# Patient Record
Sex: FEMALE | Race: WHITE | NOT HISPANIC OR LATINO | URBAN - METROPOLITAN AREA
[De-identification: names, ages, dates, MRNs, and addresses within clinical notes are randomized per-mention and may not be internally consistent; named-entity substitution may affect disease eponyms.]

---

## 2020-10-14 ENCOUNTER — EMERGENCY (EMERGENCY)
Facility: HOSPITAL | Age: 65
LOS: 0 days | Discharge: HOME | End: 2020-10-14
Attending: STUDENT IN AN ORGANIZED HEALTH CARE EDUCATION/TRAINING PROGRAM | Admitting: STUDENT IN AN ORGANIZED HEALTH CARE EDUCATION/TRAINING PROGRAM
Payer: MEDICARE

## 2020-10-14 VITALS
DIASTOLIC BLOOD PRESSURE: 79 MMHG | SYSTOLIC BLOOD PRESSURE: 169 MMHG | WEIGHT: 175.05 LBS | HEART RATE: 78 BPM | OXYGEN SATURATION: 97 % | RESPIRATION RATE: 18 BRPM | TEMPERATURE: 99 F
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
## 2020-10-14 DIAGNOSIS — S01.01XA LACERATION WITHOUT FOREIGN BODY OF SCALP, INITIAL ENCOUNTER: ICD-10-CM
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
## 2020-10-14 DIAGNOSIS — M25.512 PAIN IN LEFT SHOULDER: ICD-10-CM
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
## 2020-10-14 DIAGNOSIS — Z23 ENCOUNTER FOR IMMUNIZATION: ICD-10-CM

## 2020-10-14 PROCEDURE — 12001 RPR S/N/AX/GEN/TRNK 2.5CM/<: CPT

## 2020-10-14 PROCEDURE — 99283 EMERGENCY DEPT VISIT LOW MDM: CPT | Mod: 25

## 2020-10-14 RX ORDER — TETANUS TOXOID, REDUCED DIPHTHERIA TOXOID AND ACELLULAR PERTUSSIS VACCINE, ADSORBED 5; 2.5; 8; 8; 2.5 [IU]/.5ML; [IU]/.5ML; UG/.5ML; UG/.5ML; UG/.5ML
0.5 SUSPENSION INTRAMUSCULAR ONCE
Refills: 0 | Status: COMPLETED | OUTPATIENT
Start: 2020-10-14 | End: 2020-10-14

## 2020-10-14 RX ADMIN — TETANUS TOXOID, REDUCED DIPHTHERIA TOXOID AND ACELLULAR PERTUSSIS VACCINE, ADSORBED 0.5 MILLILITER(S): 5; 2.5; 8; 8; 2.5 SUSPENSION INTRAMUSCULAR at 10:37

## 2020-10-14 NOTE — ED ADULT NURSE NOTE - OBJECTIVE STATEMENT
Patient presents to ED with c/o left shoulder and head pain s/p trip and fall down 4 steps, denies LOC or AC use, patient states she hit the back of head her head on iron hand rail of steps.

## 2020-10-14 NOTE — ED PROVIDER NOTE - ATTENDING CONTRIBUTION TO CARE
64 yo f hx hld here for fall. pt had nonsyncopal trip/fall 2/2 slipper. pt was near her concrete steps when she fell. pt was able to hold onto the railing but hit the back of her head on the railing. no loc/n/v. pt denies chest/abd/pelvic pain. pt denies hand/wrist/elbow pain. pt states L shoulder is mildly sore but no limitation in movement. no ankle/knee/hip pain. no isela/antiplt    vss  gen- NAD, aaox3  HENT- <1cm lac to occiput, no active bleeding, EOMI, PERRL  Spine- no c/t/l spin ttp  card-rrr  lungs-ctab, no wheezing or rhonchi  abd-sntnd, no guarding or rebound  neuro- full str/sensation, cn ii-xii grossly intact, normal coordination and gait  LUE- FROM to shoulder, no pain on flex/ext/abduction/adduction, no wrist pain    head injury on rail, no loc, or vomiting. no spine tenderness  will perform wound care, ed obs period  discussed CT imaging and pt agrees- given no loc/vomiting, head injury broken by pt grabbing rail, pt comfortable w/ additional self monitoring w/  at home w/ low threshold to return

## 2020-10-14 NOTE — ED ADULT TRIAGE NOTE - CHIEF COMPLAINT QUOTE
Pt c/o left shoulder and head pain s/p trip and fall down 4 steps, denies LOC or AC use, pt hit head back of head on iron hand rail of steps

## 2020-10-14 NOTE — ED PROVIDER NOTE - CLINICAL SUMMARY MEDICAL DECISION MAKING FREE TEXT BOX
mild head injury on rail, no loc, vomiting, concussive sx   no c/t/l spine tenderness  will perform wound care, ed obs period  discussed CT imaging and pt agrees- given no loc/vomiting, head injury broken by pt grabbing rail, pt comfortable w/ additional self monitoring w/  at home w/ low threshold to return

## 2020-10-14 NOTE — ED PROVIDER NOTE - PATIENT PORTAL LINK FT
You can access the FollowMyHealth Patient Portal offered by [facility] by registering at the following website: [email or web address]. By joining [employer]s FollowMyHealth portal, you will also be able to view your health information using other applications (apps) compatible with our system.

## 2020-10-14 NOTE — ED PROVIDER NOTE - PHYSICAL EXAMINATION
Gen: NAD, AOx3  Head: NCAT  HEENT: PERRL, oral mucosa moist, normal conjunctiva, oropharynx clear without exudate or erythema  Lung: CTAB, no respiratory distress, no wheezing, rales, rhonchi  CV: normal s1/s2, rrr, Normal perfusion, pulses 2+ throughout  Abd: soft, NTND, no CVA tenderness  Genitourinary: no pelvic tenderness, pelvis stable   MSK: No edema, no visible deformities, full range of motion in all 4 extremities, LUE: 2+ pulse, no edema/erythema/abrasions/lesions, strength 5/5, AROM, no pain on flex/ext/abduction/adduction  Neuro: CN II-XII grossly intact, No focal neurologic deficits, no nystagmus/pronator drift, coordination/sensation intact, strength 5/5 BUE/BLE, steady gait   Skin: No rash, 1 cm laceration L parietal   Psych: normal affect

## 2020-10-14 NOTE — ED PROVIDER NOTE - NSFOLLOWUPINSTRUCTIONS_ED_ALL_ED_FT
Please follow up with your primary care physician within 24-72 hours and return immediately if symptoms worsen. Please return in 7 days for suture removal.     Laceration    WHAT YOU NEED TO KNOW:    A laceration is an injury to the skin and the soft tissue underneath it. Lacerations happen when you are cut or hit by something. They can happen anywhere on the body.     DISCHARGE INSTRUCTIONS:    Return to the emergency department if:     You have heavy bleeding or bleeding that does not stop after 10 minutes of holding firm, direct pressure over the wound.       Your wound opens up.     Contact your healthcare provider if:     You have a fever or chills.       Your laceration is red, warm, or swollen.      You have red streaks on your skin coming from your wound.      You have white or yellow drainage from the wound that smells bad.      You have pain that gets worse, even after treatment.       You have questions or concerns about your condition or care.     Medicines:     Prescription pain medicine may be given. Ask how to take this medicine safely.       Antibiotics help treat or prevent a bacterial infection.       Take your medicine as directed. Contact your healthcare provider if you think your medicine is not helping or if you have side effects. Tell him or her if you are allergic to any medicine. Keep a list of the medicines, vitamins, and herbs you take. Include the amounts, and when and why you take them. Bring the list or the pill bottles to follow-up visits. Carry your medicine list with you in case of an emergency.    Care for your wound as directed:     Do not get your wound wet until your healthcare provider says it is okay. Do not soak your wound in water. Do not go swimming until your healthcare provider says it is okay. Carefully wash the wound with soap and water. Gently pat the area dry or allow it to air dry.       Change your bandages when they get wet, dirty, or after washing. Apply new, clean bandages as directed. Do not apply elastic bandages or tape too tight. Do not put powders or lotions over your incision.       Apply antibiotic ointment as directed. Your healthcare provider may give you antibiotic ointment to put over your wound if you have stitches. If you have strips of tape over your incision, let them dry up and fall off on their own. If they do not fall off within 14 days, gently remove them. If you have glue over your wound, do not remove or pick at it. If your glue comes off, do not replace it with glue that you have at home.       Check your wound every day for signs of infection such as swelling, redness, or pus.     Self-care:     Apply ice on your wound for 15 to 20 minutes every hour or as directed. Use an ice pack, or put crushed ice in a plastic bag. Cover it with a towel. Ice helps prevent tissue damage and decreases swelling and pain.      Use a splint as directed. A splint will decrease movement and stress on your wound. It may help it heal faster. A splint may be used for lacerations over joints or areas of your body that bend. Ask your healthcare provider how to apply and remove a splint.       Decrease scarring of your wound by applying ointments as directed. Do not apply ointments until your healthcare provider says it is okay. You may need to wait until your wound is healed. Ask which ointment to buy and how often to use it. After your wound is healed, use sunscreen over the area when you are out in the sun. You should do this for at least 6 months to 1 year after your injury.     Follow up with your healthcare provider as directed: You may need to follow up in 24 to 48 hours to have your wound checked for infection. You will need to return in 3 to 14 days if you have stitches or staples so they can be removed. Care for your wound as directed to prevent infection and help it heal. Write down your questions so you remember to ask them during your visits.       © Copyright [employer] 2019 All illustrations and images included in CareNotes are the copyrighted property of [employer] or [employer].       Fall Prevention in the Home    Falls can cause injuries. They can happen to people of all ages. There are many things you can do to make your home safe and to help prevent falls.     WHAT CAN I DO ON THE OUTSIDE OF MY HOME?  Regularly fix the edges of walkways and driveways and fix any cracks.  Remove anything that might make you trip as you walk through a door, such as a raised step or threshold.  Trim any bushes or trees on the path to your home.  Use bright outdoor lighting.  Clear any walking paths of anything that might make someone trip, such as rocks or tools.  Regularly check to see if handrails are loose or broken. Make sure that both sides of any steps have handrails.  Any raised decks and porches should have guardrails on the edges.  Have any leaves, snow, or ice cleared regularly.  Use sand or salt on walking paths during winter.  Clean up any spills in your garage right away. This includes oil or grease spills.    WHAT CAN I DO IN THE BATHROOM?  Use night lights.  Install grab bars by the toilet and in the tub and shower. Do not use towel bars as grab bars.  Use non-skid mats or decals in the tub or shower.  If you need to sit down in the shower, use a plastic, non-slip stool.  Keep the floor dry. Clean up any water that spills on the floor as soon as it happens.  Remove soap buildup in the tub or shower regularly.  Attach bath mats securely with double-sided non-slip rug tape.  Do not have throw rugs and other things on the floor that can make you trip.    WHAT CAN I DO IN THE BEDROOM?  Use night lights.  Make sure that you have a light by your bed that is easy to reach.  Do not use any sheets or blankets that are too big for your bed. They should not hang down onto the floor.  Have a firm chair that has side arms. You can use this for support while you get dressed.  Do not have throw rugs and other things on the floor that can make you trip.    WHAT CAN I DO IN THE KITCHEN?  Clean up any spills right away.  Avoid walking on wet floors.  Keep items that you use a lot in easy-to-reach places.  If you need to reach something above you, use a strong step stool that has a grab bar.  Keep electrical cords out of the way.  Do not use floor polish or wax that makes floors slippery. If you must use wax, use non-skid floor wax.  Do not have throw rugs and other things on the floor that can make you trip.    WHAT CAN I DO WITH MY STAIRS?  Do not leave any items on the stairs.  Make sure that there are handrails on both sides of the stairs and use them. Fix handrails that are broken or loose. Make sure that handrails are as long as the stairways.  Check any carpeting to make sure that it is firmly attached to the stairs. Fix any carpet that is loose or worn.  Avoid having throw rugs at the top or bottom of the stairs. If you do have throw rugs, attach them to the floor with carpet tape.  Make sure that you have a light switch at the top of the stairs and the bottom of the stairs. If you do not have them, ask someone to add them for you.    WHAT ELSE CAN I DO TO HELP PREVENT FALLS?  Wear shoes that:  Do not have high heels.  Have rubber bottoms.  Are comfortable and fit you well.  Are closed at the toe. Do not wear sandals.  If you use a stepladder:  Make sure that it is fully opened. Do not climb a closed stepladder.  Make sure that both sides of the stepladder are locked into place.  Ask someone to hold it for you, if possible.  Clearly david and make sure that you can see:  Any grab bars or handrails.  First and last steps.  Where the edge of each step is.  Use tools that help you move around (mobility aids) if they are needed. These include:  Canes.  Walkers.  Scooters.  Crutches.  Turn on the lights when you go into a dark area. Replace any light bulbs as soon as they burn out.  Set up your furniture so you have a clear path. Avoid moving your furniture around.  If any of your floors are uneven, fix them.  If there are any pets around you, be aware of where they are.  Review your medicines with your doctor. Some medicines can make you feel dizzy. This can increase your chance of falling.    Ask your doctor what other things that you can do to help prevent falls.    ADDITIONAL NOTES AND INSTRUCTIONS    Please follow up with your Primary MD in 24-48 hr.  Seek immediate medical care for any new/worsening signs or symptoms.

## 2020-10-14 NOTE — ED PROCEDURE NOTE - NS ED PROC PERFORMED BY1 FT
DR BENJAMIN UNDER THE IMPRESSION THAT THE LDCT RECOMMENDATIONS ARE FOR ANY FINDINGS. EXPLAINED THAT THEY WERE ONLY FOR THE LUNG FINDING. DR BENJAMIN TO ORDER TEST FOR THE ADRENAL MASS.    Gabrielle Alaniz

## 2020-10-14 NOTE — ED PROVIDER NOTE - OBJECTIVE STATEMENT
66 yo female with no pertinent pmh presents after a fall down 4 concrete step outside her house. pt tripped on her slippers holding onto the railing with her L arm and hitting the back of her head on the rail. she denies any LOC/n/v. states to have a aching pain to her L shoulder w/ no ROM limitations. tetanus not up to date. denies any other symptoms including fevers, chill, headache, recent illness/travel, cough, abdominal pain, chest pain, or SOB

## 2020-10-22 ENCOUNTER — EMERGENCY (EMERGENCY)
Facility: HOSPITAL | Age: 65
LOS: 0 days | Discharge: HOME | End: 2020-10-22
Attending: EMERGENCY MEDICINE | Admitting: EMERGENCY MEDICINE
Payer: MEDICARE

## 2020-10-22 VITALS
OXYGEN SATURATION: 95 % | TEMPERATURE: 98 F | RESPIRATION RATE: 16 BRPM | HEART RATE: 85 BPM | SYSTOLIC BLOOD PRESSURE: 123 MMHG | WEIGHT: 175.05 LBS | DIASTOLIC BLOOD PRESSURE: 63 MMHG

## 2020-10-22 DIAGNOSIS — Z48.02 ENCOUNTER FOR REMOVAL OF SUTURES: ICD-10-CM

## 2020-10-22 DIAGNOSIS — S01.01XD LACERATION WITHOUT FOREIGN BODY OF SCALP, SUBSEQUENT ENCOUNTER: ICD-10-CM
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
## 2020-10-22 PROCEDURE — L9995: CPT

## 2020-10-22 NOTE — ED PROVIDER NOTE - CARE PLAN
Principal Discharge DX:	Removal of staples   Principal Discharge DX:	Removal of staples  Assessment and plan of treatment:	Plan: stapled removal, reassess.

## 2020-10-22 NOTE — ED PROVIDER NOTE - CLINICAL SUMMARY MEDICAL DECISION MAKING FREE TEXT BOX
pt presented for stapled removal, tolerated well, no signs of infection, neurovascular intact. GCS 15, aware of signs and symptoms to return for, will follow up with pmd.

## 2020-10-22 NOTE — ED PROVIDER NOTE - PHYSICAL EXAMINATION
CONST: Well appearing in NAD  HEAD: NC, 2 intact staples  SKIN: healing wound L parietal scalp w/ no redness/drainage/discharge.   NEURO: A&Ox3, No focal deficits. Strength 5/5 with no sensory deficits. Steady gait

## 2020-10-22 NOTE — ED PROVIDER NOTE - OBJECTIVE STATEMENT
65 y.o female presents to the ED for evaluation of staple removal.  Fall 8 days ago, 2 staples placed.  Since then no headaches, nausea, vomiting, neck pain, extremity pain, paresthesias.  Wound healing well w/ no redness, drainage, discharge, fevers or chills. no further complaints.

## 2020-10-22 NOTE — ED PROVIDER NOTE - PATIENT PORTAL LINK FT
You can access the FollowMyHealth Patient Portal offered by [facility] by registering at the following website: [email or web address]. By joining [employer]’s FollowMyHealth portal, you will also be able to view your health information using other applications (apps) compatible with our system.

## 2020-10-22 NOTE — ED PROVIDER NOTE - PROGRESS NOTE DETAILS
ED Attending EMI Caldwell  2 lizz removed, tolerated well, neurovascular intact, aware of wound care, signs and symptoms to return for, will follow up with with pmd. Discussed results with pt.  All questions were answered and return precautions discussed.  Pt is asx and comfortable at this time.  Unremarkable re-exam.  No further concerns at this time from pt.  Will follow up with PMD.  Pt understands and agrees with tx plan.

## 2020-10-22 NOTE — ED PROVIDER NOTE - NSFOLLOWUPINSTRUCTIONS_ED_ALL_ED_FT
Suture/Staple Removal    After having your stitches or staples removed it is typical to have minor discomfort, swelling, or redness in the area. The wound is still healing so continue to protect it from injury. Keep the wound dry and if given creams, ointments, or medication, take as instructed to by your health care professional.    SEEK IMMEDIATE MEDICAL CARE IF YOU HAVE ANY OF THE FOLLOWING SYMPTOMS: increasing redness/swelling/pain in the wound, pus coming from the wound, bad smell coming from the wound, or fever.      Follow up with your primary medical doctor in 1-2 days

## 2020-10-22 NOTE — ED PROVIDER NOTE - NS ED ROS FT
CONST: No fever, chills or bodyaches  CARD: No chest pain, palpitations  RESP: No SOB, cough,  GI: No abdominal pain, N/V/D  MS: No joint pain, back pain or extremity pain/injury  SKIN: + healing wound.   NEURO: No headache, dizziness, paresthesias or LOC

## 2020-10-22 NOTE — ED PROVIDER NOTE - ATTENDING CONTRIBUTION TO CARE
66 y/o f w two staples placed to dcalp on 10/14/20 presented for removal , no complaints. no fever, chills, n/v, discharge, pain, bleeding, weakness, HA/LH /dizzziness, or signs of infection.  Tetanus UTD.    Vital Signs: I have reviewed the initial vital signs. Constitutional: WDWN in nad. Sitting on stretcher speaking full sentences. Integumentary: No rash. 2 staples to L occipital scalp. clean, dry intact. no signs of infection. NECK: Supple, non-tender, no meningeal signs. FROM, Neurologic: AAOx3, motor 5/5 and sensation intact throughout upper and lowe ext, CN II-XII intact, No facial droop or slurring of speech. No focal deficits. GCS 15.

## 2020-12-07 ENCOUNTER — INPATIENT (INPATIENT)
Facility: HOSPITAL | Age: 65
LOS: 2 days | Discharge: HOME | End: 2020-12-10
Attending: STUDENT IN AN ORGANIZED HEALTH CARE EDUCATION/TRAINING PROGRAM | Admitting: STUDENT IN AN ORGANIZED HEALTH CARE EDUCATION/TRAINING PROGRAM
Payer: MEDICARE

## 2020-12-07 VITALS
RESPIRATION RATE: 20 BRPM | DIASTOLIC BLOOD PRESSURE: 88 MMHG | SYSTOLIC BLOOD PRESSURE: 128 MMHG | OXYGEN SATURATION: 96 % | TEMPERATURE: 98 F | HEART RATE: 126 BPM

## 2020-12-07 DIAGNOSIS — Z90.89 ACQUIRED ABSENCE OF OTHER ORGANS: Chronic | ICD-10-CM

## 2020-12-07 DIAGNOSIS — Z98.891 HISTORY OF UTERINE SCAR FROM PREVIOUS SURGERY: Chronic | ICD-10-CM

## 2020-12-07 LAB
ALBUMIN SERPL ELPH-MCNC: 3.9 G/DL — SIGNIFICANT CHANGE UP (ref 3.5–5.2)
ALP SERPL-CCNC: 43 U/L — SIGNIFICANT CHANGE UP (ref 30–115)
ALT FLD-CCNC: 56 U/L — HIGH (ref 0–41)
ANION GAP SERPL CALC-SCNC: 10 MMOL/L — SIGNIFICANT CHANGE UP (ref 7–14)
APPEARANCE UR: CLEAR — SIGNIFICANT CHANGE UP
AST SERPL-CCNC: 65 U/L — HIGH (ref 0–41)
BACTERIA # UR AUTO: NEGATIVE — SIGNIFICANT CHANGE UP
BASOPHILS # BLD AUTO: 0.01 K/UL — SIGNIFICANT CHANGE UP (ref 0–0.2)
BASOPHILS NFR BLD AUTO: 0.1 % — SIGNIFICANT CHANGE UP (ref 0–1)
BILIRUB DIRECT SERPL-MCNC: <0.2 MG/DL — SIGNIFICANT CHANGE UP (ref 0–0.2)
BILIRUB INDIRECT FLD-MCNC: >0.4 MG/DL — SIGNIFICANT CHANGE UP (ref 0.2–1.2)
BILIRUB SERPL-MCNC: 0.6 MG/DL — SIGNIFICANT CHANGE UP (ref 0.2–1.2)
BILIRUB UR-MCNC: NEGATIVE — SIGNIFICANT CHANGE UP
BUN SERPL-MCNC: 15 MG/DL — SIGNIFICANT CHANGE UP (ref 10–20)
CALCIUM SERPL-MCNC: 8.4 MG/DL — LOW (ref 8.5–10.1)
CHLORIDE SERPL-SCNC: 100 MMOL/L — SIGNIFICANT CHANGE UP (ref 98–110)
CO2 SERPL-SCNC: 24 MMOL/L — SIGNIFICANT CHANGE UP (ref 17–32)
COLOR SPEC: YELLOW — SIGNIFICANT CHANGE UP
CREAT SERPL-MCNC: 0.7 MG/DL — SIGNIFICANT CHANGE UP (ref 0.7–1.5)
DIFF PNL FLD: NEGATIVE — SIGNIFICANT CHANGE UP
EOSINOPHIL # BLD AUTO: 0 K/UL — SIGNIFICANT CHANGE UP (ref 0–0.7)
EOSINOPHIL NFR BLD AUTO: 0 % — SIGNIFICANT CHANGE UP (ref 0–8)
EPI CELLS # UR: 2 /HPF — SIGNIFICANT CHANGE UP (ref 0–5)
FLUAV AG NPH QL: NEGATIVE COUNTS — SIGNIFICANT CHANGE UP
FLUBV AG NPH QL: NEGATIVE COUNTS — SIGNIFICANT CHANGE UP
GLUCOSE SERPL-MCNC: 151 MG/DL — HIGH (ref 70–99)
GLUCOSE UR QL: NEGATIVE — SIGNIFICANT CHANGE UP
HCT VFR BLD CALC: 38.6 % — SIGNIFICANT CHANGE UP (ref 37–47)
HGB BLD-MCNC: 12.6 G/DL — SIGNIFICANT CHANGE UP (ref 12–16)
HYALINE CASTS # UR AUTO: 0 /LPF — SIGNIFICANT CHANGE UP (ref 0–7)
IMM GRANULOCYTES NFR BLD AUTO: 0.5 % — HIGH (ref 0.1–0.3)
KETONES UR-MCNC: ABNORMAL
LACTATE SERPL-SCNC: 1.3 MMOL/L — SIGNIFICANT CHANGE UP (ref 0.7–2)
LEUKOCYTE ESTERASE UR-ACNC: NEGATIVE — SIGNIFICANT CHANGE UP
LIDOCAIN IGE QN: 18 U/L — SIGNIFICANT CHANGE UP (ref 7–60)
LYMPHOCYTES # BLD AUTO: 1 K/UL — LOW (ref 1.2–3.4)
LYMPHOCYTES # BLD AUTO: 6.7 % — LOW (ref 20.5–51.1)
MCHC RBC-ENTMCNC: 30.4 PG — SIGNIFICANT CHANGE UP (ref 27–31)
MCHC RBC-ENTMCNC: 32.6 G/DL — SIGNIFICANT CHANGE UP (ref 32–37)
MCV RBC AUTO: 93 FL — SIGNIFICANT CHANGE UP (ref 81–99)
MONOCYTES # BLD AUTO: 0.47 K/UL — SIGNIFICANT CHANGE UP (ref 0.1–0.6)
MONOCYTES NFR BLD AUTO: 3.1 % — SIGNIFICANT CHANGE UP (ref 1.7–9.3)
NEUTROPHILS # BLD AUTO: 13.45 K/UL — HIGH (ref 1.4–6.5)
NEUTROPHILS NFR BLD AUTO: 89.6 % — HIGH (ref 42.2–75.2)
NITRITE UR-MCNC: NEGATIVE — SIGNIFICANT CHANGE UP
NRBC # BLD: 0 /100 WBCS — SIGNIFICANT CHANGE UP (ref 0–0)
PH UR: 7 — SIGNIFICANT CHANGE UP (ref 5–8)
PLATELET # BLD AUTO: 308 K/UL — SIGNIFICANT CHANGE UP (ref 130–400)
POTASSIUM SERPL-MCNC: 5.3 MMOL/L — HIGH (ref 3.5–5)
POTASSIUM SERPL-SCNC: 5.3 MMOL/L — HIGH (ref 3.5–5)
PROT SERPL-MCNC: 7 G/DL — SIGNIFICANT CHANGE UP (ref 6–8)
PROT UR-MCNC: ABNORMAL
RBC # BLD: 4.15 M/UL — LOW (ref 4.2–5.4)
RBC # FLD: 12.6 % — SIGNIFICANT CHANGE UP (ref 11.5–14.5)
RBC CASTS # UR COMP ASSIST: 5 /HPF — HIGH (ref 0–4)
RSV RNA NPH QL NAA+NON-PROBE: NEGATIVE COUNTS — SIGNIFICANT CHANGE UP
SARS-COV-2 RNA SPEC QL NAA+PROBE: POSITIVE COUNTS
SODIUM SERPL-SCNC: 134 MMOL/L — LOW (ref 135–146)
SP GR SPEC: 1.03 — SIGNIFICANT CHANGE UP (ref 1.01–1.03)
TROPONIN T SERPL-MCNC: <0.01 NG/ML — SIGNIFICANT CHANGE UP
UROBILINOGEN FLD QL: SIGNIFICANT CHANGE UP
WBC # BLD: 15.01 K/UL — HIGH (ref 4.8–10.8)
WBC # FLD AUTO: 15.01 K/UL — HIGH (ref 4.8–10.8)
WBC UR QL: 3 /HPF — SIGNIFICANT CHANGE UP (ref 0–5)

## 2020-12-07 PROCEDURE — 74177 CT ABD & PELVIS W/CONTRAST: CPT | Mod: 26

## 2020-12-07 PROCEDURE — 93010 ELECTROCARDIOGRAM REPORT: CPT

## 2020-12-07 PROCEDURE — 99285 EMERGENCY DEPT VISIT HI MDM: CPT

## 2020-12-07 PROCEDURE — 99221 1ST HOSP IP/OBS SF/LOW 40: CPT | Mod: AI,GC

## 2020-12-07 RX ORDER — ACETAMINOPHEN 500 MG
650 TABLET ORAL ONCE
Refills: 0 | Status: COMPLETED | OUTPATIENT
Start: 2020-12-07 | End: 2020-12-07

## 2020-12-07 RX ORDER — SODIUM CHLORIDE 9 MG/ML
500 INJECTION, SOLUTION INTRAVENOUS ONCE
Refills: 0 | Status: COMPLETED | OUTPATIENT
Start: 2020-12-07 | End: 2020-12-07

## 2020-12-07 RX ORDER — HEPARIN SODIUM 5000 [USP'U]/ML
5000 INJECTION INTRAVENOUS; SUBCUTANEOUS EVERY 8 HOURS
Refills: 0 | Status: DISCONTINUED | OUTPATIENT
Start: 2020-12-07 | End: 2020-12-10

## 2020-12-07 RX ORDER — ONDANSETRON 8 MG/1
4 TABLET, FILM COATED ORAL ONCE
Refills: 0 | Status: COMPLETED | OUTPATIENT
Start: 2020-12-07 | End: 2020-12-07

## 2020-12-07 RX ORDER — PIPERACILLIN AND TAZOBACTAM 4; .5 G/20ML; G/20ML
3.38 INJECTION, POWDER, LYOPHILIZED, FOR SOLUTION INTRAVENOUS ONCE
Refills: 0 | Status: DISCONTINUED | OUTPATIENT
Start: 2020-12-07 | End: 2020-12-10

## 2020-12-07 RX ORDER — PIPERACILLIN AND TAZOBACTAM 4; .5 G/20ML; G/20ML
3.38 INJECTION, POWDER, LYOPHILIZED, FOR SOLUTION INTRAVENOUS EVERY 8 HOURS
Refills: 0 | Status: DISCONTINUED | OUTPATIENT
Start: 2020-12-07 | End: 2020-12-10

## 2020-12-07 RX ORDER — PANTOPRAZOLE SODIUM 20 MG/1
40 TABLET, DELAYED RELEASE ORAL DAILY
Refills: 0 | Status: DISCONTINUED | OUTPATIENT
Start: 2020-12-07 | End: 2020-12-10

## 2020-12-07 RX ORDER — SODIUM CHLORIDE 9 MG/ML
1000 INJECTION, SOLUTION INTRAVENOUS
Refills: 0 | Status: DISCONTINUED | OUTPATIENT
Start: 2020-12-07 | End: 2020-12-09

## 2020-12-07 RX ORDER — MORPHINE SULFATE 50 MG/1
4 CAPSULE, EXTENDED RELEASE ORAL EVERY 4 HOURS
Refills: 0 | Status: DISCONTINUED | OUTPATIENT
Start: 2020-12-07 | End: 2020-12-07

## 2020-12-07 RX ORDER — CEFOTETAN DISODIUM 1 G
1 VIAL (EA) INJECTION ONCE
Refills: 0 | Status: COMPLETED | OUTPATIENT
Start: 2020-12-07 | End: 2020-12-07

## 2020-12-07 RX ADMIN — SODIUM CHLORIDE 125 MILLILITER(S): 9 INJECTION, SOLUTION INTRAVENOUS at 19:24

## 2020-12-07 RX ADMIN — PIPERACILLIN AND TAZOBACTAM 25 GRAM(S): 4; .5 INJECTION, POWDER, LYOPHILIZED, FOR SOLUTION INTRAVENOUS at 22:58

## 2020-12-07 RX ADMIN — ONDANSETRON 4 MILLIGRAM(S): 8 TABLET, FILM COATED ORAL at 16:43

## 2020-12-07 RX ADMIN — HEPARIN SODIUM 5000 UNIT(S): 5000 INJECTION INTRAVENOUS; SUBCUTANEOUS at 22:58

## 2020-12-07 RX ADMIN — Medication 650 MILLIGRAM(S): at 13:26

## 2020-12-07 RX ADMIN — SODIUM CHLORIDE 2000 MILLILITER(S): 9 INJECTION, SOLUTION INTRAVENOUS at 18:00

## 2020-12-07 RX ADMIN — Medication 100 GRAM(S): at 16:43

## 2020-12-07 NOTE — H&P ADULT - NSHPLABSRESULTS_GEN_ALL_CORE
< from: CT Abdomen and Pelvis w/ IV Cont (12.07.20 @ 15:49) >    IMPRESSION:  1.  Acute appendicitis without evidence for pneumoperitoneum or abscess formation.  2.  Possible 1.3 cm right lower lobe nodule versus focal dependent atelectasis. An outpatient CT chest is recommended in 3 months to assess for resolution.    < end of copied text >

## 2020-12-07 NOTE — PATIENT PROFILE ADULT - DO YOU LACK THE NECESSARY SUPPORT TO HELP YOU COPE WITH LIFE CHALLENGES?
[FreeTextEntry1] : Location: back\par Quality: sharp \par Severity: 8/10; worse lately \par Duration: over 2 yr \par Timing: recurrent \par Context: atraumatic \par Aggravating Factors: standing; walking \par Alleviating Factors: narcotics; BENITA \par Associated Symptoms: no weakness; no numbness/tingling; no locking; no weight loss; no fever; no chills; no change in bowel/bladder habits; no swelling; no redness; no warmth; no popping; no clicking; no ecchymosis; +radiation down to left groin; stiffness\par Prior Studies: CT scan\par \par 3/2015 treated with Abx for suspected pneumonia but pain did not resolve. GI doctor got a CT scan showing diverticulitis. Went to ER as well.\par 8/2017 right L5 and S1 BENITA - 100% relief
yes

## 2020-12-07 NOTE — H&P ADULT - NSHPPHYSICALEXAM_GEN_ALL_CORE
PHYSICAL EXAM:  GENERAL: NAD, lying in bed comfortably  HEAD:  Atraumatic, Normocephalic  EYES: EOMI, PERRLA, conjunctiva and sclera clear  ENT: Moist mucous membranes  CHEST/LUNG: Unlabored respirations  HEART: Regular rate and rhythm  ABDOMEN: Soft, mild RLQ tenderness, Nondistended. Lower midline abdominal scar well healed. Negative psoas/obturator sign  EXTREMITIES:  2+ Peripheral Pulses, brisk capillary refill. No clubbing, cyanosis, or edema  NERVOUS SYSTEM:  Alert & Oriented X3, speech clear. No deficits   SKIN: No rashes or lesions

## 2020-12-07 NOTE — H&P ADULT - HISTORY OF PRESENT ILLNESS
Patient is a 65 F with PMH HLD, thymoma (s/p robotic thymomectomy), c-sections x2, presents with 1 day of worsening RLQ abdominal pain that radiates to the umbilicus. She reports nausea earlier today but thought it was due to something she ate and tried to make herself vomit to relieve her pain. This did not help and she says that her pain has been getting worse throughout the day, prompting her to come to the ED for evaluation. Of note, she also has tested positive for COVID-19 13 days ago and had a positive test on admission to the ED. She does report cough and intermittent fever over the past 2 weeks, but was afebrile upon arrival to the ED.

## 2020-12-07 NOTE — ED ADULT NURSE NOTE - OBJECTIVE STATEMENT
Pt c/o right lower quadrant abdominal pain starting last night. Pt states she was eating popcorn and pain started. Pt denies diarrhea but did say she was nauseas and "made herself throw up". Pt tested COVID positive two week ago. Pt states she had little to no symptoms with a mild fever for a few days. Pt denies SOB/ chest pain.

## 2020-12-07 NOTE — H&P ADULT - ATTENDING COMMENTS
I examined the patient with the PA/Resident and discussed my plan with the Resident/PA.   I agree with the above resident/pa note unless directly contradicted below.     [address] 65 F with PMH HLD, thymoma (s/p robotic thymomectomy), c-sections x2, COVID +, presents with acute non-perforated appendicitis  - Covid + , hemodynamically stable, Abd- soft, TTP RLQ only , - Rovsing , - rebound, - guarding   -will admit to sugical floor   -continue IV antibiotics (will give zosyn)  -will do serial abdominal exams   -plan for non-op management unless abdominal symptoms worsen --> risks of benefits of conservative management with ABx vs surgery explained, including need for operation later or appendix perforation. Due to her covid the patient would like to try conservative management  -no pain meds to prevent masking symptoms  - PM Repeat labs  - NPO/ IVF

## 2020-12-07 NOTE — H&P ADULT - ASSESSMENT
Patient is a 65 F with PMH HLD, thymoma (s/p robotic thymomectomy), c-sections x2, COVID +, presents with acute non-perforated appendicitis   Patient is a 65 F with PMH HLD, thymoma (s/p robotic thymomectomy), c-sections x2, COVID +, presents with acute non-perforated appendicitis    -will admit to sugical floor   -continue IV antibiotics (will give zosyn)  -will do serial abdominal exams   -plan for non-op management unless abdominal symptoms worsen  -no pain meds to prevent masking symptoms

## 2020-12-07 NOTE — ED PROVIDER NOTE - PHYSICAL EXAMINATION
Vital Signs: I have reviewed the initial vital signs.  Constitutional: well-nourished, appears stated age, no acute distress.  HEENT: Airway patent, protected.   CV: regular rate, regular rhythm, well-perfused extremities, 2+ b/l DP and radial pulses equal.  Lungs: BCTA, no increased WOB.  ABD: soft, ttp over the RLQ and slight ttp over periumbilical region, ND, no guarding or rebound, no pulsatile mass, no hernias.   MSK: Neck supple, nontender, nl ROM, no stepoff. Chest nontender. Back nontender in TLS spine or to b/l bony structures or flanks. Ext nontender, nl rom, no deformity.   INTEG: Skin warm, dry, no rash.  NEURO: A&Ox3, moving all extremities, normal speech  PSYCH: Calm, cooperative, normal affect and interaction.

## 2020-12-07 NOTE — ED ADULT TRIAGE NOTE - CHIEF COMPLAINT QUOTE
patient tested + 13 days agohad fever at home today - complaining or RLQ abdominal pain after eating popcorn - denies hx of diverticulitis

## 2020-12-07 NOTE — ED PROVIDER NOTE - ATTENDING CONTRIBUTION TO CARE
64 y/o female h/o HLD, c/s, thyroid sx, non-smoker COVID positive (not hospitalized) now presents with RLQ pain x yesterday, persistent, radiates to umbilicus, denies modifying factors, fever (Tm 100, oral), 1 episode nbnb vomiting and cough, passing stool and flatus, denies diarrhea, anorexia, change in bowel habits or urinary sx, vaginal d/c or other associated complaints at present.     Old chart reviewed.  I have reviewed and agree with the nursing note, except as documented in my note.    VSS, awake, alert, non-toxic appearing, lying comfortably in stretcher, in NAD, no scleral icterus, oropharynx clear, mmm, no jaundice, skin rash or lesions, chest CTAB, non-labored breathing, no w/r/r, +S1/S2, RRR, no m/r/g, abdomen soft, RLQ ttp w/o peritoneal signs, ND, +BS, no hernias or distention, no pulsatile masses or bruits appreciated, no CVA tenderness, no peripheral edema or deformities, alert, clear speech and steady gait.

## 2020-12-08 LAB
ANION GAP SERPL CALC-SCNC: 10 MMOL/L — SIGNIFICANT CHANGE UP (ref 7–14)
ANION GAP SERPL CALC-SCNC: 11 MMOL/L — SIGNIFICANT CHANGE UP (ref 7–14)
BASOPHILS # BLD AUTO: 0.01 K/UL — SIGNIFICANT CHANGE UP (ref 0–0.2)
BASOPHILS # BLD AUTO: 0.02 K/UL — SIGNIFICANT CHANGE UP (ref 0–0.2)
BASOPHILS NFR BLD AUTO: 0.1 % — SIGNIFICANT CHANGE UP (ref 0–1)
BASOPHILS NFR BLD AUTO: 0.2 % — SIGNIFICANT CHANGE UP (ref 0–1)
BUN SERPL-MCNC: 10 MG/DL — SIGNIFICANT CHANGE UP (ref 10–20)
BUN SERPL-MCNC: 11 MG/DL — SIGNIFICANT CHANGE UP (ref 10–20)
CALCIUM SERPL-MCNC: 8.1 MG/DL — LOW (ref 8.5–10.1)
CALCIUM SERPL-MCNC: 8.2 MG/DL — LOW (ref 8.5–10.1)
CHLORIDE SERPL-SCNC: 102 MMOL/L — SIGNIFICANT CHANGE UP (ref 98–110)
CHLORIDE SERPL-SCNC: 104 MMOL/L — SIGNIFICANT CHANGE UP (ref 98–110)
CO2 SERPL-SCNC: 23 MMOL/L — SIGNIFICANT CHANGE UP (ref 17–32)
CO2 SERPL-SCNC: 24 MMOL/L — SIGNIFICANT CHANGE UP (ref 17–32)
CREAT SERPL-MCNC: 0.6 MG/DL — LOW (ref 0.7–1.5)
CREAT SERPL-MCNC: 0.6 MG/DL — LOW (ref 0.7–1.5)
CULTURE RESULTS: SIGNIFICANT CHANGE UP
EOSINOPHIL # BLD AUTO: 0.01 K/UL — SIGNIFICANT CHANGE UP (ref 0–0.7)
EOSINOPHIL # BLD AUTO: 0.02 K/UL — SIGNIFICANT CHANGE UP (ref 0–0.7)
EOSINOPHIL NFR BLD AUTO: 0.1 % — SIGNIFICANT CHANGE UP (ref 0–8)
EOSINOPHIL NFR BLD AUTO: 0.2 % — SIGNIFICANT CHANGE UP (ref 0–8)
GLUCOSE SERPL-MCNC: 130 MG/DL — HIGH (ref 70–99)
GLUCOSE SERPL-MCNC: 82 MG/DL — SIGNIFICANT CHANGE UP (ref 70–99)
HCT VFR BLD CALC: 34.8 % — LOW (ref 37–47)
HCT VFR BLD CALC: 36.5 % — LOW (ref 37–47)
HCV AB S/CO SERPL IA: 0.04 COI — SIGNIFICANT CHANGE UP
HCV AB SERPL-IMP: SIGNIFICANT CHANGE UP
HGB BLD-MCNC: 11.3 G/DL — LOW (ref 12–16)
HGB BLD-MCNC: 11.9 G/DL — LOW (ref 12–16)
IMM GRANULOCYTES NFR BLD AUTO: 0.3 % — SIGNIFICANT CHANGE UP (ref 0.1–0.3)
IMM GRANULOCYTES NFR BLD AUTO: 0.4 % — HIGH (ref 0.1–0.3)
LYMPHOCYTES # BLD AUTO: 1.6 K/UL — SIGNIFICANT CHANGE UP (ref 1.2–3.4)
LYMPHOCYTES # BLD AUTO: 15.1 % — LOW (ref 20.5–51.1)
LYMPHOCYTES # BLD AUTO: 2.09 K/UL — SIGNIFICANT CHANGE UP (ref 1.2–3.4)
LYMPHOCYTES # BLD AUTO: 22.6 % — SIGNIFICANT CHANGE UP (ref 20.5–51.1)
MAGNESIUM SERPL-MCNC: 1.9 MG/DL — SIGNIFICANT CHANGE UP (ref 1.8–2.4)
MAGNESIUM SERPL-MCNC: 2 MG/DL — SIGNIFICANT CHANGE UP (ref 1.8–2.4)
MCHC RBC-ENTMCNC: 30.4 PG — SIGNIFICANT CHANGE UP (ref 27–31)
MCHC RBC-ENTMCNC: 30.5 PG — SIGNIFICANT CHANGE UP (ref 27–31)
MCHC RBC-ENTMCNC: 32.5 G/DL — SIGNIFICANT CHANGE UP (ref 32–37)
MCHC RBC-ENTMCNC: 32.6 G/DL — SIGNIFICANT CHANGE UP (ref 32–37)
MCV RBC AUTO: 93.5 FL — SIGNIFICANT CHANGE UP (ref 81–99)
MCV RBC AUTO: 93.6 FL — SIGNIFICANT CHANGE UP (ref 81–99)
MONOCYTES # BLD AUTO: 0.68 K/UL — HIGH (ref 0.1–0.6)
MONOCYTES # BLD AUTO: 0.73 K/UL — HIGH (ref 0.1–0.6)
MONOCYTES NFR BLD AUTO: 6.4 % — SIGNIFICANT CHANGE UP (ref 1.7–9.3)
MONOCYTES NFR BLD AUTO: 7.9 % — SIGNIFICANT CHANGE UP (ref 1.7–9.3)
NEUTROPHILS # BLD AUTO: 6.33 K/UL — SIGNIFICANT CHANGE UP (ref 1.4–6.5)
NEUTROPHILS # BLD AUTO: 8.24 K/UL — HIGH (ref 1.4–6.5)
NEUTROPHILS NFR BLD AUTO: 68.7 % — SIGNIFICANT CHANGE UP (ref 42.2–75.2)
NEUTROPHILS NFR BLD AUTO: 78 % — HIGH (ref 42.2–75.2)
NRBC # BLD: 0 /100 WBCS — SIGNIFICANT CHANGE UP (ref 0–0)
NRBC # BLD: 0 /100 WBCS — SIGNIFICANT CHANGE UP (ref 0–0)
PHOSPHATE SERPL-MCNC: 2.5 MG/DL — SIGNIFICANT CHANGE UP (ref 2.1–4.9)
PHOSPHATE SERPL-MCNC: 2.6 MG/DL — SIGNIFICANT CHANGE UP (ref 2.1–4.9)
PLATELET # BLD AUTO: 258 K/UL — SIGNIFICANT CHANGE UP (ref 130–400)
PLATELET # BLD AUTO: 289 K/UL — SIGNIFICANT CHANGE UP (ref 130–400)
POTASSIUM SERPL-MCNC: 3.7 MMOL/L — SIGNIFICANT CHANGE UP (ref 3.5–5)
POTASSIUM SERPL-MCNC: 3.7 MMOL/L — SIGNIFICANT CHANGE UP (ref 3.5–5)
POTASSIUM SERPL-SCNC: 3.7 MMOL/L — SIGNIFICANT CHANGE UP (ref 3.5–5)
POTASSIUM SERPL-SCNC: 3.7 MMOL/L — SIGNIFICANT CHANGE UP (ref 3.5–5)
RBC # BLD: 3.72 M/UL — LOW (ref 4.2–5.4)
RBC # BLD: 3.9 M/UL — LOW (ref 4.2–5.4)
RBC # FLD: 12.5 % — SIGNIFICANT CHANGE UP (ref 11.5–14.5)
RBC # FLD: 12.6 % — SIGNIFICANT CHANGE UP (ref 11.5–14.5)
SODIUM SERPL-SCNC: 136 MMOL/L — SIGNIFICANT CHANGE UP (ref 135–146)
SODIUM SERPL-SCNC: 138 MMOL/L — SIGNIFICANT CHANGE UP (ref 135–146)
SPECIMEN SOURCE: SIGNIFICANT CHANGE UP
WBC # BLD: 10.57 K/UL — SIGNIFICANT CHANGE UP (ref 4.8–10.8)
WBC # BLD: 9.23 K/UL — SIGNIFICANT CHANGE UP (ref 4.8–10.8)
WBC # FLD AUTO: 10.57 K/UL — SIGNIFICANT CHANGE UP (ref 4.8–10.8)
WBC # FLD AUTO: 9.23 K/UL — SIGNIFICANT CHANGE UP (ref 4.8–10.8)

## 2020-12-08 PROCEDURE — 99232 SBSQ HOSP IP/OBS MODERATE 35: CPT | Mod: GC

## 2020-12-08 RX ORDER — ACETAMINOPHEN 500 MG
650 TABLET ORAL EVERY 6 HOURS
Refills: 0 | Status: DISCONTINUED | OUTPATIENT
Start: 2020-12-08 | End: 2020-12-10

## 2020-12-08 RX ORDER — POTASSIUM PHOSPHATE, MONOBASIC POTASSIUM PHOSPHATE, DIBASIC 236; 224 MG/ML; MG/ML
15 INJECTION, SOLUTION INTRAVENOUS ONCE
Refills: 0 | Status: COMPLETED | OUTPATIENT
Start: 2020-12-08 | End: 2020-12-08

## 2020-12-08 RX ADMIN — POTASSIUM PHOSPHATE, MONOBASIC POTASSIUM PHOSPHATE, DIBASIC 62.5 MILLIMOLE(S): 236; 224 INJECTION, SOLUTION INTRAVENOUS at 05:01

## 2020-12-08 RX ADMIN — PIPERACILLIN AND TAZOBACTAM 25 GRAM(S): 4; .5 INJECTION, POWDER, LYOPHILIZED, FOR SOLUTION INTRAVENOUS at 05:04

## 2020-12-08 RX ADMIN — Medication 650 MILLIGRAM(S): at 23:11

## 2020-12-08 RX ADMIN — PANTOPRAZOLE SODIUM 40 MILLIGRAM(S): 20 TABLET, DELAYED RELEASE ORAL at 11:10

## 2020-12-08 RX ADMIN — PIPERACILLIN AND TAZOBACTAM 25 GRAM(S): 4; .5 INJECTION, POWDER, LYOPHILIZED, FOR SOLUTION INTRAVENOUS at 21:08

## 2020-12-08 RX ADMIN — SODIUM CHLORIDE 125 MILLILITER(S): 9 INJECTION, SOLUTION INTRAVENOUS at 19:38

## 2020-12-08 RX ADMIN — PIPERACILLIN AND TAZOBACTAM 25 GRAM(S): 4; .5 INJECTION, POWDER, LYOPHILIZED, FOR SOLUTION INTRAVENOUS at 15:17

## 2020-12-08 RX ADMIN — Medication 650 MILLIGRAM(S): at 23:13

## 2020-12-08 RX ADMIN — HEPARIN SODIUM 5000 UNIT(S): 5000 INJECTION INTRAVENOUS; SUBCUTANEOUS at 21:08

## 2020-12-08 RX ADMIN — HEPARIN SODIUM 5000 UNIT(S): 5000 INJECTION INTRAVENOUS; SUBCUTANEOUS at 13:07

## 2020-12-08 RX ADMIN — HEPARIN SODIUM 5000 UNIT(S): 5000 INJECTION INTRAVENOUS; SUBCUTANEOUS at 05:04

## 2020-12-08 NOTE — PROGRESS NOTE ADULT - ASSESSMENT
65 F with PMH HLD, thymoma (s/p robotic thymomectomy), c-sections x2, COVID +, presents with acute non-perforated appendicitis    Plan:  - continue IV abx  - continue serial exam  - possible OR if clinically deteriorrates

## 2020-12-08 NOTE — CONSULT NOTE ADULT - SUBJECTIVE AND OBJECTIVE BOX
65 F with PMH HLD, thymoma (s/p robotic thymomectomy), c-sections x2, presents with 1 day of worsening RLQ abdominal pain that radiates to the umbilicus. She reports nausea earlier today but thought it was due to something she ate and tried to make herself vomit to relieve her pain. This did not help and she says that her pain has been getting worse throughout the day, prompting her to come to the ED for evaluation. Of note, she also has tested positive for COVID-19 13 days ago and had a positive test on admission to the ED. She does report cough and intermittent fever over the past 2 weeks, but was afebrile upon arrival to the ED    PAST MEDICAL & SURGICAL HISTORY:  History of thymoma  Hyperlipidemia  History of thymectomy  H/O  section    MEDICATIONS  (STANDING):  heparin   Injectable 5000 Unit(s) SubCutaneous every 8 hours  lactated ringers. 1000 milliLiter(s) (125 mL/Hr) IV Continuous <Continuous>  pantoprazole  Injectable 40 milliGRAM(s) IV Push daily  piperacillin/tazobactam IVPB. 3.375 Gram(s) IV Intermittent once  piperacillin/tazobactam IVPB.. 3.375 Gram(s) IV Intermittent every 8 hours    MEDICATIONS  (PRN):    Vital Signs Last 24 Hrs  T(C): 36.2 (08 Dec 2020 04:00), Max: 37.1 (07 Dec 2020 19:26)  T(F): 97.1 (08 Dec 2020 04:00), Max: 98.7 (07 Dec 2020 19:26)  HR: 79 (08 Dec 2020 04:00) (79 - 126)  BP: 123/65 (08 Dec 2020 04:00) (110/64 - 136/74)  BP(mean): --  RR: 20 (08 Dec 2020 04:00) (20 - 20)  SpO2: 100% (08 Dec 2020 04:00) (95% - 100%)    Physical Examination:     Constitutional: well-nourished, appears stated age, no acute distress.  HEENT: Airway patent, protected.   CV: regular rate, regular rhythm, well-perfused extremities, 2+ b/l DP and radial pulses equal.  Lungs: BCTA, no increased WOB.  ABD: soft, ttp over the RLQ and slight ttp over periumbilical region, ND, no guarding or rebound, no pulsatile mass, no hernias.   MSK: Neck supple, nontender, nl ROM, no stepoff. Chest nontender. Back nontender in TLS spine or to b/l bony structures or flanks. Ext nontender, nl rom, no deformity.   INTEG: Skin warm, dry, no rash.  NEURO: A&Ox3, moving all extremities, normal speech  PSYCH: Calm, cooperative, normal affect and interaction                          11.3   10.57 )-----------( 258      ( 08 Dec 2020 01:04 )             34.8     12-08    138  |  104  |  11  ----------------------------<  130<H>  3.7   |  24  |  0.6<L>    Ca    8.1<L>      08 Dec 2020 01:04  Phos  2.5     12-  Mg     1.9     12-    TPro  7.0  /  Alb  3.9  /  TBili  0.6  /  DBili  <0.2  /  AST  65<H>  /  ALT  56<H>  /  AlkPhos  43  12-      Flu With COVID-19 By BONIFACIO (20 @ 11:40)   SARS-CoV-2 Result: Positive: This Respiratory Panel uses polymerase chain reaction (PCR) to detect for   influenza A; influenza B; respiratory syncytial virus; and SARS-CoV-2.   This test was validated by [facility] [employer] and is in use under the FDA   Emergency Use Authorization (EUA) for clinical labs CLIA-certified to   perform high complexity testing. Test results should be correlated with   clinical presentation, patient history, and epidemiology. Counts   Influenza A Result: Negative Counts   Influenza B Result: Negative Counts   Resp Syn Virus Result: Negative Counts       EXAM:  CT ABDOMEN AND PELVIS IC            PROCEDURE DATE:  2020            INTERPRETATION:  CLINICAL STATEMENT: Right lower quadrant abdominal pain. Evaluate for appendicitis    TECHNIQUE: Contiguous axial CT images were obtained from the lower chest to the pubic symphysis following administration of 100cc Optiray 320 intravenous contrast.  Oral contrast was not administered.  Reformatted images in the coronal and sagittal planes were acquired.    COMPARISON CT: None.    OTHER STUDIES USED FOR CORRELATION: None.      FINDINGS:    LOWER CHEST: Possible 1.3 cm right lower lobe nodule versus dependent atelectasis (5/5). Additional bibasilar subsegmental dependent atelectasis    HEPATOBILIARY: Unremarkable.    SPLEEN: Unremarkable.    PANCREAS: Unremarkable.    ADRENAL GLANDS: Unremarkable.    KIDNEYS: Symmetric renal enhancement bilaterally. No hydronephrosis.    ABDOMINOPELVIC NODES: No bulky lymphadenopathy.    PELVIC ORGANS: Unremarkable.    PERITONEUM/MESENTERY/BOWEL: Thick-walled prominent appendix measuring 1 cm diameter with adjacent fat stranding and trace fluid. No evidence of pneumoperitoneum or abscess formation. Small hiatal hernia. Scattered colonic diverticulosis, without evidence for acute diverticulitis. Moderatediffuse colonic stool burden.    BONES/SOFT TISSUES: Degenerative changes of the spine and bilateral hips noted.  Tiny fat-containing umbilical hernia.    IMPRESSION:  1.  Acute appendicitis without evidence for pneumoperitoneum or abscess formation.  2.  Possible 1.3 cm right lower lobe nodule versus focal dependent atelectasis. An outpatient CT chest is recommended in 3 months to assess for resolution.        Dr. Galvan Spoke with KENROY BALL on 2020 4:08 PM with readback.              EL GARBER; Attending Radiologist  This document has been electronically signed. Dec  7 2020  4:09PM

## 2020-12-08 NOTE — PROGRESS NOTE ADULT - ATTENDING COMMENTS
WBC improved. RLQ pain improving. hemodynamically stable. Afebrile. Continue Zosyn. Clears tonight. Adv diet in am.

## 2020-12-09 LAB
ANION GAP SERPL CALC-SCNC: 12 MMOL/L — SIGNIFICANT CHANGE UP (ref 7–14)
BASOPHILS # BLD AUTO: 0.02 K/UL — SIGNIFICANT CHANGE UP (ref 0–0.2)
BASOPHILS NFR BLD AUTO: 0.2 % — SIGNIFICANT CHANGE UP (ref 0–1)
BUN SERPL-MCNC: 11 MG/DL — SIGNIFICANT CHANGE UP (ref 10–20)
CALCIUM SERPL-MCNC: 8.3 MG/DL — LOW (ref 8.5–10.1)
CHLORIDE SERPL-SCNC: 102 MMOL/L — SIGNIFICANT CHANGE UP (ref 98–110)
CO2 SERPL-SCNC: 23 MMOL/L — SIGNIFICANT CHANGE UP (ref 17–32)
CREAT SERPL-MCNC: 0.6 MG/DL — LOW (ref 0.7–1.5)
EOSINOPHIL # BLD AUTO: 0.05 K/UL — SIGNIFICANT CHANGE UP (ref 0–0.7)
EOSINOPHIL NFR BLD AUTO: 0.6 % — SIGNIFICANT CHANGE UP (ref 0–8)
GLUCOSE SERPL-MCNC: 111 MG/DL — HIGH (ref 70–99)
HCT VFR BLD CALC: 36.6 % — LOW (ref 37–47)
HGB BLD-MCNC: 11.9 G/DL — LOW (ref 12–16)
IMM GRANULOCYTES NFR BLD AUTO: 0.3 % — SIGNIFICANT CHANGE UP (ref 0.1–0.3)
LYMPHOCYTES # BLD AUTO: 1.81 K/UL — SIGNIFICANT CHANGE UP (ref 1.2–3.4)
LYMPHOCYTES # BLD AUTO: 20.5 % — SIGNIFICANT CHANGE UP (ref 20.5–51.1)
MAGNESIUM SERPL-MCNC: 2 MG/DL — SIGNIFICANT CHANGE UP (ref 1.8–2.4)
MCHC RBC-ENTMCNC: 30.4 PG — SIGNIFICANT CHANGE UP (ref 27–31)
MCHC RBC-ENTMCNC: 32.5 G/DL — SIGNIFICANT CHANGE UP (ref 32–37)
MCV RBC AUTO: 93.4 FL — SIGNIFICANT CHANGE UP (ref 81–99)
MONOCYTES # BLD AUTO: 0.73 K/UL — HIGH (ref 0.1–0.6)
MONOCYTES NFR BLD AUTO: 8.3 % — SIGNIFICANT CHANGE UP (ref 1.7–9.3)
NEUTROPHILS # BLD AUTO: 6.18 K/UL — SIGNIFICANT CHANGE UP (ref 1.4–6.5)
NEUTROPHILS NFR BLD AUTO: 70.1 % — SIGNIFICANT CHANGE UP (ref 42.2–75.2)
NRBC # BLD: 0 /100 WBCS — SIGNIFICANT CHANGE UP (ref 0–0)
PHOSPHATE SERPL-MCNC: 3 MG/DL — SIGNIFICANT CHANGE UP (ref 2.1–4.9)
PLATELET # BLD AUTO: 312 K/UL — SIGNIFICANT CHANGE UP (ref 130–400)
POTASSIUM SERPL-MCNC: 3.8 MMOL/L — SIGNIFICANT CHANGE UP (ref 3.5–5)
POTASSIUM SERPL-SCNC: 3.8 MMOL/L — SIGNIFICANT CHANGE UP (ref 3.5–5)
RBC # BLD: 3.92 M/UL — LOW (ref 4.2–5.4)
RBC # FLD: 12.4 % — SIGNIFICANT CHANGE UP (ref 11.5–14.5)
SODIUM SERPL-SCNC: 137 MMOL/L — SIGNIFICANT CHANGE UP (ref 135–146)
WBC # BLD: 8.82 K/UL — SIGNIFICANT CHANGE UP (ref 4.8–10.8)
WBC # FLD AUTO: 8.82 K/UL — SIGNIFICANT CHANGE UP (ref 4.8–10.8)

## 2020-12-09 PROCEDURE — 99231 SBSQ HOSP IP/OBS SF/LOW 25: CPT | Mod: GC

## 2020-12-09 RX ORDER — LANOLIN ALCOHOL/MO/W.PET/CERES
5 CREAM (GRAM) TOPICAL AT BEDTIME
Refills: 0 | Status: DISCONTINUED | OUTPATIENT
Start: 2020-12-09 | End: 2020-12-10

## 2020-12-09 RX ORDER — POTASSIUM CHLORIDE 20 MEQ
20 PACKET (EA) ORAL
Refills: 0 | Status: COMPLETED | OUTPATIENT
Start: 2020-12-09 | End: 2020-12-09

## 2020-12-09 RX ADMIN — Medication 20 MILLIEQUIVALENT(S): at 05:18

## 2020-12-09 RX ADMIN — PANTOPRAZOLE SODIUM 40 MILLIGRAM(S): 20 TABLET, DELAYED RELEASE ORAL at 11:00

## 2020-12-09 RX ADMIN — PIPERACILLIN AND TAZOBACTAM 25 GRAM(S): 4; .5 INJECTION, POWDER, LYOPHILIZED, FOR SOLUTION INTRAVENOUS at 05:18

## 2020-12-09 RX ADMIN — PIPERACILLIN AND TAZOBACTAM 25 GRAM(S): 4; .5 INJECTION, POWDER, LYOPHILIZED, FOR SOLUTION INTRAVENOUS at 13:27

## 2020-12-09 RX ADMIN — Medication 650 MILLIGRAM(S): at 21:11

## 2020-12-09 RX ADMIN — Medication 650 MILLIGRAM(S): at 21:10

## 2020-12-09 RX ADMIN — HEPARIN SODIUM 5000 UNIT(S): 5000 INJECTION INTRAVENOUS; SUBCUTANEOUS at 21:11

## 2020-12-09 RX ADMIN — Medication 20 MILLIEQUIVALENT(S): at 06:00

## 2020-12-09 RX ADMIN — PIPERACILLIN AND TAZOBACTAM 25 GRAM(S): 4; .5 INJECTION, POWDER, LYOPHILIZED, FOR SOLUTION INTRAVENOUS at 21:11

## 2020-12-09 RX ADMIN — HEPARIN SODIUM 5000 UNIT(S): 5000 INJECTION INTRAVENOUS; SUBCUTANEOUS at 13:28

## 2020-12-09 RX ADMIN — HEPARIN SODIUM 5000 UNIT(S): 5000 INJECTION INTRAVENOUS; SUBCUTANEOUS at 05:18

## 2020-12-09 RX ADMIN — Medication 5 MILLIGRAM(S): at 21:10

## 2020-12-09 NOTE — PROGRESS NOTE ADULT - ASSESSMENT
Assessment:  65y Female patient admitted for medical management for acute appendicitis  Patient seen and examined at bedside. NAD.     Plan:    -continue medical management for acute appendicitis  -Normal WBC  -advance diet as tolerated  - Monitor vitals  - Monitor labs and replete as necessary  - Monitor for bowel function  - Continue Pain Medications if necessary  - Encourage ambulation as tolerated  - DVT and GI Prophylaxis  - Follow PT/Physiatry if necessary  - Contact social work/case management for necessary patient needs.           Date/Time: 12-09-20 @ 11:13

## 2020-12-09 NOTE — PROGRESS NOTE ADULT - ASSESSMENT
66 y/o female presents with acute nonperforated appendicitis and COVID 19 +     # Acute Appendicitis     - NPO  - Serial EXAMS  - Antibiotics  - No pain meds   - patient medically stable for OR if deemed necessary     # COVID -19     - post 10 days   - no sob and afebrile   - post replicative phase no intervention   - consider ID

## 2020-12-09 NOTE — PROGRESS NOTE ADULT - ATTENDING COMMENTS
No complaints. Tolerating clears. afebrile. WBC improving. RLQ pain resolved. Advance to reg diet. Continue IV abx. DC tomorrow likely.

## 2020-12-10 ENCOUNTER — TRANSCRIPTION ENCOUNTER (OUTPATIENT)
Age: 65
End: 2020-12-10

## 2020-12-10 VITALS
OXYGEN SATURATION: 93 % | TEMPERATURE: 99 F | SYSTOLIC BLOOD PRESSURE: 124 MMHG | RESPIRATION RATE: 18 BRPM | DIASTOLIC BLOOD PRESSURE: 70 MMHG | HEART RATE: 80 BPM

## 2020-12-10 PROCEDURE — 99232 SBSQ HOSP IP/OBS MODERATE 35: CPT | Mod: GC

## 2020-12-10 RX ORDER — POTASSIUM CHLORIDE 20 MEQ
20 PACKET (EA) ORAL ONCE
Refills: 0 | Status: DISCONTINUED | OUTPATIENT
Start: 2020-12-10 | End: 2020-12-10

## 2020-12-10 RX ORDER — POTASSIUM CHLORIDE 20 MEQ
20 PACKET (EA) ORAL ONCE
Refills: 0 | Status: COMPLETED | OUTPATIENT
Start: 2020-12-10 | End: 2020-12-10

## 2020-12-10 RX ORDER — ACETAMINOPHEN 500 MG
2 TABLET ORAL
Qty: 0 | Refills: 0 | DISCHARGE
Start: 2020-12-10

## 2020-12-10 RX ORDER — MOXIFLOXACIN HYDROCHLORIDE TABLETS, 400 MG 400 MG/1
1 TABLET, FILM COATED ORAL
Qty: 9 | Refills: 0
Start: 2020-12-10 | End: 2020-12-18

## 2020-12-10 RX ORDER — METRONIDAZOLE 500 MG
1 TABLET ORAL
Qty: 27 | Refills: 0
Start: 2020-12-10 | End: 2020-12-18

## 2020-12-10 RX ADMIN — Medication 650 MILLIGRAM(S): at 09:11

## 2020-12-10 RX ADMIN — PIPERACILLIN AND TAZOBACTAM 25 GRAM(S): 4; .5 INJECTION, POWDER, LYOPHILIZED, FOR SOLUTION INTRAVENOUS at 05:23

## 2020-12-10 RX ADMIN — HEPARIN SODIUM 5000 UNIT(S): 5000 INJECTION INTRAVENOUS; SUBCUTANEOUS at 05:23

## 2020-12-10 RX ADMIN — PANTOPRAZOLE SODIUM 40 MILLIGRAM(S): 20 TABLET, DELAYED RELEASE ORAL at 11:35

## 2020-12-10 RX ADMIN — Medication 20 MILLIEQUIVALENT(S): at 05:23

## 2020-12-10 NOTE — CONSULT NOTE ADULT - SUBJECTIVE AND OBJECTIVE BOX
JADSOREN  65y, Female  Allergy: No Known Allergies      All historical available data reviewed.    HPI:  Patient is a 65 F with PMH HLD, thymoma (s/p robotic thymomectomy), c-sections x2, presents with 1 day of worsening RLQ abdominal pain that radiates to the umbilicus. She reports nausea earlier today but thought it was due to something she ate and tried to make herself vomit to relieve her pain. This did not help and she says that her pain has been getting worse throughout the day, prompting her to come to the ED for evaluation. Of note, she also has tested positive for COVID-19 13 days ago and had a positive test on admission to the ED. She does report cough and intermittent fever over the past 2 weeks, but was afebrile upon arrival to the ED.  (07 Dec 2020 17:04)  ID called for appendicitis    FAMILY HISTORY:  No pertinent family history in first degree relatives      PAST MEDICAL & SURGICAL HISTORY:  History of thymoma    Hyperlipidemia    History of thymectomy    H/O  section          VITALS:  T(F): 98, Max: 98 (12-10-20 @ 04:00)  HR: 80  BP: 112/64  RR: 18Vital Signs Last 24 Hrs  T(C): 36.7 (10 Dec 2020 04:00), Max: 36.7 (10 Dec 2020 04:00)  T(F): 98 (10 Dec 2020 04:00), Max: 98 (10 Dec 2020 04:00)  HR: 80 (10 Dec 2020 04:00) (80 - 85)  BP: 112/64 (10 Dec 2020 04:00) (111/60 - 139/70)  BP(mean): --  RR: 18 (10 Dec 2020 04:00) (18 - 19)  SpO2: 96% (10 Dec 2020 04:00) (94% - 96%)    TESTS & MEASUREMENTS:                        11.9   8.82  )-----------( 312      ( 09 Dec 2020 22:46 )             36.6     12-09    137  |  102  |  11  ----------------------------<  111<H>  3.8   |  23  |  0.6<L>    Ca    8.3<L>      09 Dec 2020 22:46  Phos  3.0     12-09  Mg     2.0               Culture - Urine (collected 20 @ 12:58)  Source: .Urine Clean Catch (Midstream)  Final Report (20 @ 18:34):    Normal Urogenital gustavo present            RADIOLOGY & ADDITIONAL TESTS:  Personal review of radiological diagnostics performed  Echo and EKG results noted when applicable.     MEDICATIONS:  acetaminophen   Tablet .. 650 milliGRAM(s) Oral every 6 hours PRN  heparin   Injectable 5000 Unit(s) SubCutaneous every 8 hours  melatonin 5 milliGRAM(s) Oral at bedtime  pantoprazole  Injectable 40 milliGRAM(s) IV Push daily  piperacillin/tazobactam IVPB. 3.375 Gram(s) IV Intermittent once  piperacillin/tazobactam IVPB.. 3.375 Gram(s) IV Intermittent every 8 hours      ANTIBIOTICS:  piperacillin/tazobactam IVPB. 3.375 Gram(s) IV Intermittent once  piperacillin/tazobactam IVPB.. 3.375 Gram(s) IV Intermittent every 8 hours

## 2020-12-10 NOTE — DISCHARGE NOTE PROVIDER - CARE PROVIDER_API CALL
Eleazar Mccray (DO)  [facility]  [address] [place] [address]  Phone: [phone]  Fax: [phone]  Follow Up Time: 2 weeks

## 2020-12-10 NOTE — DISCHARGE NOTE PROVIDER - NSDCMRMEDTOKEN_GEN_ALL_CORE_FT
acetaminophen 325 mg oral tablet: 2 tab(s) orally every 6 hours, As needed, Mild Pain (1 - 3)  Avelox 400 mg oral tablet: 1 tab(s) orally every 24 hours   Flagyl 500 mg oral tablet: 1 tab(s) orally 3 times a day

## 2020-12-10 NOTE — CONSULT NOTE ADULT - ASSESSMENT
64 y/o female presents with acute nonperforated appendicitis and COVID 19 +     # Acute Appendicitis     - NPO  - Serial EXAMS  - Antibiotics  - No pain meds   - patient medically stable for OR if deemed necessary     # COVID -19     - post 10 days   - no sob and afebrile   - post replicative phase no intervention   - consider ID
65 F with PMH HLD, thymoma (s/p robotic thymomectomy), c-sections x2, presents with 1 day of worsening RLQ abdominal pain that radiates to the umbilicus. She reports nausea earlier today but thought it was due to something she ate and tried to make herself vomit to relieve her pain. This did not help and she says that her pain has been getting worse throughout the day, prompting her to come to the ED for evaluation. Of note, she also has tested positive for COVID-19 13 days ago and had a positive test on admission to the ED.     IMPRESSION;  COVID-19 asymptomatic .  Pt is in the late inflammatory response phase ot the illness based on the onset of symptoms.   Presently asymptomatic and CT with few ground glass opacities  100% RA  Aute appendicitis with no perforation. Clinically no peritonitis    RECOMMENDATIONS;  Total 14 days quarantine for COVID-19 from onset of symptoms  Po Avelox 40 mg q24h  Po Flagyl 500 mg q8h  Duration 9 more days  recall prn please

## 2020-12-10 NOTE — PROGRESS NOTE ADULT - ATTENDING COMMENTS
I examined the patient with the PA/Resident and discussed my plan with the Resident/PA.   I agree with the above resident/pa note unless directly contradicted below.     [address] 65y Female p/w appendicitis, COVID +     afebrile , abdominal pain improved, tolerated reg diet   OK for discharge on PO Avelox 40 mg q24h, Flagyl 500 mg q8h, Duration 9 more days  Follow up in office next week

## 2020-12-10 NOTE — DISCHARGE NOTE PROVIDER - HOSPITAL COURSE
`Patient is a 65 F with PMH HLD, thymoma (s/p robotic thymomectomy), c-sections x2, presents with 1 day of worsening RLQ abdominal pain that radiates to the umbilicus. She reports nausea earlier today but thought it was due to something she ate and tried to make herself vomit to relieve her pain. This did not help and she says that her pain has been getting worse throughout the day, prompting her to come to the ED for evaluation. Of note, she also has tested positive for COVID-19 13 days ago and had a positive test on admission to the ED. She does report cough and intermittent fever over the past 2 weeks, but was afebrile upon arrival to the ED. CT scan showed acute appendicitis, the patient was admitted for medical management of acute appendicitis (tx non-operatively dt covid status). The patient was started on IV abx, kept npo and advanced (tolerated). The patient will be discharged on 9 days of abx and will be instructed to home quarantine for 14 days. The patient will follow up in the office in 2 weeks. `Patient is a 65 F with PMH HLD, thymoma (s/p robotic thymomectomy), c-sections x2, presents with 1 day of worsening RLQ abdominal pain that radiates to the umbilicus. She reports nausea earlier today but thought it was due to something she ate and tried to make herself vomit to relieve her pain. This did not help and she says that her pain has been getting worse throughout the day, prompting her to come to the ED for evaluation. Of note, she also has tested positive for COVID-19 13 days ago and had a positive test on admission to the ED. She does report cough and intermittent fever over the past 2 weeks, but was afebrile upon arrival to the ED. CT scan showed acute appendicitis, the patient was admitted for medical management of acute appendicitis (tx non-operatively dt covid status). The patient was started on IV abx, kept npo and advanced (tolerated). The patient will be discharged on 9 days of abx and will be instructed to home quarantine for 14 days. The patient will follow up in the office Next Wed, 12/16

## 2020-12-10 NOTE — PROGRESS NOTE ADULT - ASSESSMENT
Assessment:  65y Female patient admitted for medical management for acute appendicitis  Patient seen and examined at bedside. NAD.     Plan:    -continue medical management for acute appendicitis  -Normal WBC  -advance diet as tolerated  - Monitor vitals  - Monitor labs and replete as necessary  - Monitor for bowel function  - Continue Pain Medications if necessary  - Encourage ambulation as tolerated  - DVT and GI Prophylaxis  - Follow PT/Physiatry if necessary  - Contact social work/case management for necessary patient needs.

## 2020-12-10 NOTE — DISCHARGE NOTE NURSING/CASE MANAGEMENT/SOCIAL WORK - PATIENT PORTAL LINK FT
You can access the FollowMyHealth Patient Portal offered by [facility] by registering at the following website: [email or web address]. By joining [facility]’s FollowMyHealth portal, you will also be able to view your health information using other applications (apps) compatible with our system.

## 2020-12-10 NOTE — DISCHARGE NOTE PROVIDER - NSDCCPCAREPLAN_GEN_ALL_CORE_FT
PRINCIPAL DISCHARGE DIAGNOSIS  Diagnosis: Appendicitis  Assessment and Plan of Treatment: Continue oral antibiotics; Avelox and Flagyl for 9 days. Prescriptions were sent to the pharmacy.   Continue regular diet.  Please call the office or return to ED if worsening abdominal pain, unable to tolerate diet or chest/pain shortness of breath.  Follow up as an outpatient in 2 weeks, call to schedule the appointment, [phone].      SECONDARY DISCHARGE DIAGNOSES  Diagnosis: COVID-19  Assessment and Plan of Treatment: Please self quarantine for 14 days.  Wear a mask if in the presence of others.   Please call your PCP if you develop shortness of breath or cough.

## 2020-12-10 NOTE — PROGRESS NOTE ADULT - ASSESSMENT
64 y/o female presents with acute nonperforated appendicitis and COVID 19 +     # Acute Appendicitis     - advancing diet   - Serial EXAMS  - Antibiotics  - No pain meds   - patient medically stable for OR if deemed necessary     # COVID -19     - post 10 days   - no sob and afebrile

## 2020-12-11 ENCOUNTER — TRANSCRIPTION ENCOUNTER (OUTPATIENT)
Age: 65
End: 2020-12-11

## 2020-12-11 PROBLEM — Z87.898 PERSONAL HISTORY OF OTHER SPECIFIED CONDITIONS: Chronic | Status: ACTIVE | Noted: 2020-12-07

## 2020-12-11 PROBLEM — E78.5 HYPERLIPIDEMIA, UNSPECIFIED: Chronic | Status: ACTIVE | Noted: 2020-12-07

## 2020-12-11 PROBLEM — Z00.00 ENCOUNTER FOR PREVENTIVE HEALTH EXAMINATION: Status: ACTIVE | Noted: 2020-12-11

## 2020-12-14 ENCOUNTER — TRANSCRIPTION ENCOUNTER (OUTPATIENT)
Age: 65
End: 2020-12-14

## 2020-12-14 DIAGNOSIS — R10.9 UNSPECIFIED ABDOMINAL PAIN: ICD-10-CM

## 2020-12-14 DIAGNOSIS — K35.80 UNSPECIFIED ACUTE APPENDICITIS: ICD-10-CM

## 2020-12-14 DIAGNOSIS — U07.1 COVID-19: ICD-10-CM

## 2020-12-16 NOTE — PATIENT PROFILE ADULT - NSTRANSFERBELONGINGSRESP_GEN_A_NUR
[facility] Progress Note    Admitting Date and Time: 12/13/2020  3:29 PM  Admit Dx: Acute pancreatitis, unspecified complication status, unspecified pancreatitis type [K85.90]    Subjective:  Patient is being followed for Acute pancreatitis, unspecified complication status, unspecified pancreatitis type [K85.90]   Pt no BM in several days, + flatus. Pain a little bit better 7 out of 10 range. Low fiber diet ordered she has been taking in adequate p.o. discussed lowering fluids, moving around the room, ambulating up in the chair. Labs and questions answered. Per RN: Madhu diet    ROS: denies fever, chills, cp, sob, n/v, HA unless stated above.   Constipation     potassium chloride  40 mEq Oral BID WC    docusate sodium  100 mg Oral Daily    senna  1 tablet Oral Nightly    polyethylene glycol  17 g Oral Daily    lipase-protease-amylase  36,000 Units Oral TID WC    metoclopramide  10 mg Intravenous Q6H    sodium chloride flush  10 mL Intravenous 2 times per day    enoxaparin  40 mg Subcutaneous Daily    cefTRIAXone (ROCEPHIN) IV  1 g Intravenous Q24H    metroNIDAZOLE  500 mg Intravenous Q8H    pantoprazole  40 mg Intravenous Daily         HYDROmorphone, 0.5 mg, Q6H PRN      ondansetron, 4 mg, Q6H PRN      sodium chloride flush, 10 mL, PRN      polyethylene glycol, 17 g, Daily PRN      acetaminophen, 650 mg, Q6H PRN    Or      acetaminophen, 650 mg, Q6H PRN         Objective:    /70   Pulse 104   Temp 98.2 °F (36.8 °C) (Oral)   Resp 16   Wt 177 lb 11.2 oz (80.6 kg)   SpO2 97%   BMI 32.50 kg/m²     General Appearance: alert and oriented to person, place and time and in no acute distress  Skin: warm and dry  Head: normocephalic and atraumatic  Eyes: pupils equal, round, and reactive to light, extraocular eye movements intact, conjunctivae normal  Neck: neck supple and non tender without mass   Pulmonary/Chest: clear to auscultation bilaterally- no wheezes, rales or rhonchi, normal air movement, no respiratory distress  Cardiovascular: normal rate, normal S1 and S2 and no carotid bruits  Abdomen: Softly distended, less pain  across the mid abdominal to flank areas normal bowel sounds, no masses or organomegaly. Extremities: no cyanosis, no clubbing and no edema  Neurologic: no cranial nerve deficit and speech normal  Hep-Lock      Recent Labs     12/15/20  0350 12/15/20  0645 12/16/20  0231    136 134   K 2.9* 3.0* 2.9*   CL 98 99 99   CO2 29 29 27   BUN 3* 3* <2*   CREATININE 0.6 0.5 0.5   GLUCOSE 148* 150* 142*   CALCIUM 8.1* 8.2* 8.2*       Recent Labs     12/15/20  0350 12/15/20  0645 12/16/20  0231   WBC 16.9* 15.8* 13.3*   RBC 3.14* 3.11* 3.01*   HGB 10.3* 10.3* 9.9*   HCT 31.1* 30.4* 29.6*   MCV 99.0 97.7 98.3   MCH 32.8 33.1 32.9   MCHC 33.1 33.9 33.4   RDW 15.0 14.9 15.0    153 141   MPV 11.2 11.3 10.4       Radiology:   CT abdomen and pelvis  Impression   Findings consistent with severe acute pancreatitis with inflammatory changes   and fluid extending to the left anterior pararenal space.  Fluid also extends   along the paracolic gutters bilaterally, perisplenic and perihepatic spaces. No organized circumscribed peripancreatic fluid collections. Somewhat decreased enhancement of the body of the pancreas which may be   secondary to edema.  Developing necrosis can not be excluded.  Follow-up   recommended. Small bilateral pleural effusions. Thickening of the proximal jejunum secondary to the adjacent inflammatory   changes urinary mine 3.6 cm right adnexal cyst.  Further evaluation or   follow-up with nonemergent ultrasound should be considered. Ultrasound gallbladder  Impression   1.  Poor visualization of the pancreas on this exam secondary to overlying   bowel gas.    2.  Diffusely increased echogenicity of the liver is suggestive of an   underlying infiltrative pathology the most common of which is hepatic   steatosis.  Please consider correlation with patient's liver function test.   3.  Small right pleural effusion. Assessment:    Active Problems:    Acute pancreatitis    Ileus ([address]  Resolved Problems:    * No resolved hospital problems. *      Plan:-Admitted 10/13/2020 at 2200 hrs. 1.  Abdominal pain secondary to acute pancreatitis-in the setting of mild alcohol intake. Per documentation she has 15 alcoholic drinks a week including vodka lastly. Abdominal pain ongoing 1 to 2 days  -CT abdomen and pelvis+ pancreatitis  -Ultrasound gallbladder  -Lipase 369, follow trend. Was originally [phone], down to 114 today  -GI input  -Pain control -with Dilaudid. Reduced frequency yesterday and will continue to taper off  -Continue with ceftriaxone and Flagyl  -Lactated Ringer's at 200 mL an hour  -Hep panel Neg. JENARO neg      Low-grade fever 99.9 range improving. Secondary to above  -Monitor trend, is been on antibiotics day 3 now. Will order blood cultures as needed for any fever 101. Leukocytosis-likely secondary to dehydration, pancreatitis. White blood cell count trended down to 13. Also some noted ileus and constipation        Peptic ulcer disease  - PPI          Chronic back pain  Supportive care for now        Alcohol use  Stated she is just regularly irregular social drinker nothing significant does not really drink every day she stated        Nicotine use  -Quit smoking cigarettes a few months ago        Hyperlipidemia  -Labs noted November 2020 with total cholesterol 202 and triglycerides 156 was following outpatient      Hypokalemia -2.9 today we will replace again today. Had IV potassium yesterday 40 M EQ's, will replace today 36 M EQ's twice daily        Hypomagnesia-corrected to 1.6  -Repeat mag today 1.8        Lactic acidosis-4.2-2.1 corrected  -Continue hydration      Constipation-no BM in about 5 days  - add a stool softener and senna  -KUB + ileus.   Continue fluids, MiraLAX and stool softeners reglan, glycerin suppository      Follow with daily labs    CODE STATUS  DVT prophylaxis-Lovenox  N.p.o. to clears with meds -advance diet to low fiber today. Decrease lactated Ringer's 200 cc an hour today and likely stop. NOTE: This report was transcribed using voice recognition software. Every effort was made to ensure accuracy; however, inadvertent computerized transcription errors may be present.   Electronically signed by SB Chamorro on 12/16/2020 at 9:47 AM yes

## 2020-12-23 ENCOUNTER — APPOINTMENT (OUTPATIENT)
Dept: SURGERY | Facility: CLINIC | Age: 65
End: 2020-12-23

## 2020-12-28 ENCOUNTER — NON-APPOINTMENT (OUTPATIENT)
Age: 65
End: 2020-12-28

## 2020-12-29 ENCOUNTER — APPOINTMENT (OUTPATIENT)
Dept: SURGERY | Facility: CLINIC | Age: 65
End: 2020-12-29
Payer: MEDICARE

## 2020-12-29 VITALS
HEART RATE: 83 BPM | WEIGHT: 165 LBS | SYSTOLIC BLOOD PRESSURE: 120 MMHG | DIASTOLIC BLOOD PRESSURE: 70 MMHG | HEIGHT: 63 IN | OXYGEN SATURATION: 96 % | TEMPERATURE: 97.8 F | BODY MASS INDEX: 29.23 KG/M2

## 2020-12-29 DIAGNOSIS — Z78.9 OTHER SPECIFIED HEALTH STATUS: ICD-10-CM

## 2020-12-29 DIAGNOSIS — Z87.39 PERSONAL HISTORY OF OTHER DISEASES OF THE MUSCULOSKELETAL SYSTEM AND CONNECTIVE TISSUE: ICD-10-CM

## 2020-12-29 DIAGNOSIS — Z86.39 PERSONAL HISTORY OF OTHER ENDOCRINE, NUTRITIONAL AND METABOLIC DISEASE: ICD-10-CM

## 2020-12-29 PROCEDURE — 99203 OFFICE O/P NEW LOW 30 MIN: CPT

## 2020-12-29 PROCEDURE — 99072 ADDL SUPL MATRL&STAF TM PHE: CPT

## 2020-12-29 NOTE — HISTORY OF PRESENT ILLNESS
[de-identified] : This 65-year-old female presents for evaluation of possible laparoscopic appendectomy for possible history of acute appendicitis to prevent recurrent appendicitis. Hospital course was reviewed. Patient was hospitalized for abdominal pain about 10-14 days following diagnosis of cold 19 and was to be positive again and had repeat test which was negative now. Patient denied any fever or other symptoms. Patient's CT scan of the abdomen for evaluation and was reported to have a mild acute appendicitis with dilated appendix  to 10 mm. Patient's pain improved and 24 hours and was discharged home on antibiotic therapy which she completed 10 days ago.

## 2020-12-29 NOTE — ASSESSMENT
[FreeTextEntry1] : 65-year-old female with questionable history of appendicitis based on the CT scan only at the time of diagnosis code 19 quick resolution of symptoms and 24 hours. CT scan did not show any evidence of fecalith or other cause of obstruction with minimal dilatation of appendix alone. Patient was explained that with questionable history of appendicitis no need for interval appendectomy at this point because of the fear of recurence. Patient was explained and symptoms of acute appendicitis and will report to the emergency room with symptoms of pain recur

## 2020-12-29 NOTE — PHYSICAL EXAM
[de-identified] : An average built female ambulatory in no discomfort [de-identified] : Abdomen soft nondistended nontender with well-healed lower midline scar. No palpable mass no organomegaly

## 2021-03-08 ENCOUNTER — OUTPATIENT (OUTPATIENT)
Dept: OUTPATIENT SERVICES | Facility: HOSPITAL | Age: 66
LOS: 1 days | Discharge: HOME | End: 2021-03-08
Payer: MEDICARE

## 2021-03-08 DIAGNOSIS — Z98.891 HISTORY OF UTERINE SCAR FROM PREVIOUS SURGERY: Chronic | ICD-10-CM

## 2021-03-08 DIAGNOSIS — R07.9 CHEST PAIN, UNSPECIFIED: ICD-10-CM

## 2021-03-08 DIAGNOSIS — Z90.89 ACQUIRED ABSENCE OF OTHER ORGANS: Chronic | ICD-10-CM

## 2021-03-08 PROCEDURE — 75574 CT ANGIO HRT W/3D IMAGE: CPT | Mod: 26

## 2021-06-23 NOTE — ED ADULT NURSE NOTE - BREATHING, MLM
Detail Level: Detailed
Was A Bandage Applied: Yes
Punch Size In Mm: 6
Biopsy Type: H and E
Anesthesia Type: 1% lidocaine with epinephrine
Anesthesia Volume In Cc: 0.5
Spontaneous, unlabored and symmetrical
Additional Anesthesia Volume In Cc (Will Not Render If 0): 0
Hemostasis: None
Epidermal Sutures: 4-0 Ethilon
Wound Care: Petrolatum
Dressing: bandage
Suture Removal: 14 days
Lab: [id number]
Lab Facility: [id number]
Render Path Notes In Note?: No
Consent: Verbal consent was obtained and risks were reviewed including but not limited to scarring, infection, bleeding, scabbing, incomplete removal, nerve damage and allergy to anesthesia.
Post-Care Instructions: I reviewed with the patient in detail post-care instructions. Patient is to keep the biopsy site dry overnight, and then apply bacitracin twice daily until healed. Patient may apply hydrogen peroxide soaks to remove any crusting.
Home Suture Removal Text: Patient was provided a home suture removal kit and will remove their sutures at home.  If they have any questions or difficulties they will call the office.
Notification Instructions: Patient will be notified of biopsy results. However, patient instructed to call the office if not contacted within 2 weeks.
Billing Type: Third-Party Bill
Information: Selecting Yes will display possible errors in your note based on the variables you have selected. This validation is only offered as a suggestion for you. PLEASE NOTE THAT THE VALIDATION TEXT WILL BE REMOVED WHEN YOU FINALIZE YOUR NOTE. IF YOU WANT TO FAX A PRELIMINARY NOTE YOU WILL NEED TO TOGGLE THIS TO 'NO' IF YOU DO NOT WANT IT IN YOUR FAXED NOTE.

## 2021-07-27 ENCOUNTER — APPOINTMENT (OUTPATIENT)
Dept: UROLOGY | Facility: CLINIC | Age: 66
End: 2021-07-27
Payer: MEDICARE

## 2021-07-27 VITALS — BODY MASS INDEX: 31.18 KG/M2 | WEIGHT: 176 LBS | HEIGHT: 63 IN

## 2021-07-27 DIAGNOSIS — Z82.5 FAMILY HISTORY OF ASTHMA AND OTHER CHRONIC LOWER RESPIRATORY DISEASES: ICD-10-CM

## 2021-07-27 DIAGNOSIS — Z82.61 FAMILY HISTORY OF ARTHRITIS: ICD-10-CM

## 2021-07-27 DIAGNOSIS — Z83.3 FAMILY HISTORY OF DIABETES MELLITUS: ICD-10-CM

## 2021-07-27 DIAGNOSIS — Z87.39 PERSONAL HISTORY OF OTHER DISEASES OF THE MUSCULOSKELETAL SYSTEM AND CONNECTIVE TISSUE: ICD-10-CM

## 2021-07-27 DIAGNOSIS — Z82.0 FAMILY HISTORY OF EPILEPSY AND OTHER DISEASES OF THE NERVOUS SYSTEM: ICD-10-CM

## 2021-07-27 DIAGNOSIS — Z80.9 FAMILY HISTORY OF MALIGNANT NEOPLASM, UNSPECIFIED: ICD-10-CM

## 2021-07-27 PROCEDURE — 99205 OFFICE O/P NEW HI 60 MIN: CPT

## 2021-07-27 PROCEDURE — 99072 ADDL SUPL MATRL&STAF TM PHE: CPT

## 2021-07-27 NOTE — HISTORY OF PRESENT ILLNESS
[FreeTextEntry1] : SOREN ST[address] is a 66 year old female with a Hx of Spinal stenosis requiring 3 epidurals in the past year, who presents for consultation for urinary incontinence and increased daytime frequency.  \par \par Pt reports urge incontinence, in the past month the Sx are worsening. Pt reports daytime urinary frequency of every hour. \par Pt reports bowel movements regularly \par Denies gross hematuria, dysuria or associated symptoms. \par Nocturia 2-3x. rare raghavendra as well.  denies vaginal bulging.\par Pt reports spinal stenosis and is experiencing pain radiating on the LE. \par \par Baldder scan 7/27/2021:\par  initial PVR= 150 cc (15-20 mins after voiding)\par PVR post void = 0 cc . \par \par CT ap images visualized 12/2020 appendicitis: normal kidneys bL, no hydronephrosis, no renal stones, bladder not particularly distended, no bladder masses seen. \par \par Denies  PMH including previous kidney stones, recurrent UTIs. \par Family History: No  malignancies\par Social History: Never a smoker. 5-6 cups of coffee per day. No spicy food. \par \par 3/2021:\par Creat= 0.66\par eGFR= 93\par A1C= 5.9\par UA=negative

## 2021-07-27 NOTE — ADDENDUM
[FreeTextEntry1] : Patient's note was transcribed with the assistance of a medical scribe under the supervision of Dr. Mckinney.\par I, Dr. Mckinney, have reviewed the patient's chart and agree that it aligns with my medical decisions.\par Moses Morales, our scribe, also served as a chaperone for physical examination purposes.\par \par \par

## 2021-07-27 NOTE — ASSESSMENT
[FreeTextEntry1] : SOREN ST[address] is a 66 year old female with a Hx of Spinal stenosis requiring 3 epidurals, who presents for consultation for urinary incontinence and increased daytime frequency, urgency likely multifactorial OAB.\par Significant exacerbation of OAB recently.\par \par PVR= 0 cc after second void, initially 150cc.\par \par  Pt reports she would like to start medication as her Sx are bothersome and affects her quality of life. Prefers to try conservative measures for a few weeks and if the Sx don't improve start Sanctura. \par \par Plan: \par Recommend for the pt to make dietary changes such as avoiding bladder irritants (coffee, spicy food, soda)\par Kegel Exercises \par weight loss\par RBUS \par UA and UCx\par Voiding Diary \par Strart Sanctura (side effects: dry mouth, constipation, glaucoma explained to the pt, pt understand and agrees) \par f/u in 3 months for Sx assessment and reviewing the results.\par \par \par Re: anticholinergic -- the patient understands that this medication may cause constipation and dry mouth. Understands risk of urinary retention. The patient does not have closed angle glaucoma. The patient will take measures to prevent constipation. Of course, excessive drinking to relieve dry mouth can increase frequency. The patient will stop taking this medication if he has difficulty voiding or if any adverse reactions are too bothersome. Also discussed possibility of altered mental status.\par

## 2021-07-29 ENCOUNTER — NON-APPOINTMENT (OUTPATIENT)
Age: 66
End: 2021-07-29

## 2021-08-02 ENCOUNTER — NON-APPOINTMENT (OUTPATIENT)
Age: 66
End: 2021-08-02

## 2021-11-01 ENCOUNTER — APPOINTMENT (OUTPATIENT)
Dept: UROLOGY | Facility: CLINIC | Age: 66
End: 2021-11-01

## 2021-11-22 VITALS — SYSTOLIC BLOOD PRESSURE: 142 MMHG | BODY MASS INDEX: 29.94 KG/M2 | DIASTOLIC BLOOD PRESSURE: 72 MMHG | WEIGHT: 169 LBS

## 2022-01-23 NOTE — ED PROVIDER NOTE - NS ED ATTENDING STATEMENT MOD
I have personally performed a face to face diagnostic evaluation on this patient. I have reviewed the ACP note and agree with the history, exam and plan of care, except as noted. I have evaluated the patient and agree with the documentation and assessment as made by the PA. We have discussed plan of care and work up for the patient.    Brief HPI:  26 yo F Brazilian butt lift on 1/2/22 at [email or web address], admitted at [facility] for cellulitis discharged on Keflex, presents to ED c/o persistent fevers, chills, left sided CP x 2 weeks.     Vitals:   Reviewed    Exam:    GEN:  Non-toxic appearing, non-distressed, speaking full sentences, non-diaphoretic, AAOx3  HEENT:  NCAT, neck supple, EOMI, PERRLA, sclera anicteric, no conjunctival pallor or injection, no stridor, normal voice, no tonsillar exudate, uvula midline  CV:  tachy, regular, s1/s2 audible, no murmurs, rubs or gallops, peripheral pulses 2+ and symmetric  PULM:  non-labored respirations, lungs clear to auscultation bilaterally, no wheezes, crackles or rales  ABD:  non distended, non-tender, no rebound, no guarding, negative Hernández's sign, bowel sounds normal, no cvat  MSK:  no gross deformity, non-tender extremities and joints, range of motion grossly normal appearing, no extremity edema, extremities warm and well perfused   NEURO:  AAOx3, CN II-XII intact, motor 5/5 in upper and lower extremities bilaterally, sensation grossly intact in extremities and trunk, finger to nose testing wnl, no nystagmus, negative Romberg, no pronator drift, no gait deficit  SKIN:  gluteal area with hyperpigmentation; no erythema, induration, fluctuance; no discharge     A/P:  24 y/o female s/p Brazilian butt lift on 1/2/22 at [facility] in [place], admitted at [facility] for cellulitis discharged on Keflex, presents to ED c/o persistent fevers, chills, left sided CP x 2 weeks.  Tachy on arrival, low grade temp but rectally afebrile.  Exam with no e/o cellulitis to gluteal area.  EKG sinus tachycardia.  Possible deeper infection given recent surgery.  PE also considered given tachycardia, cp, and recent hospitalization.  Will check labs, CTPE, CTAP, abx.  Dispo pending.

## 2022-03-21 NOTE — ED PROVIDER NOTE - OBJECTIVE STATEMENT
Speaking Coherently
65F hx DLD, COVID presents with abdominal pain. Patient notes she was diagnosed with COVID 13 days ago, has been having intermittent fevers, started having Right-sided abdominal pain yesterday that radiates periumbilically, tried to make herself vomit to get the popcorn she ate out, denies any diarrhea/dysuria/back pain/chest pain/shortness of breath.

## 2022-08-25 NOTE — ED ADULT NURSE NOTE - NSFALLRSKASSESASSIST_ED_ALL_ED
[FreeTextEntry1] : Patient with history of coronary artery disease and diabetes. Routine labs will be drawn. Patient is concerned about his loud snoring. I feel a home sleep study is indicated. Patient states he had a lot of difficulty sleeping with an in lab  study previously. He requests a home study. We'll check labs and have patient call me for results next week.
no

## 2022-09-20 ENCOUNTER — APPOINTMENT (OUTPATIENT)
Dept: UROGYNECOLOGY | Facility: CLINIC | Age: 67
End: 2022-09-20

## 2022-09-20 VITALS
WEIGHT: 177 LBS | SYSTOLIC BLOOD PRESSURE: 159 MMHG | DIASTOLIC BLOOD PRESSURE: 94 MMHG | HEART RATE: 75 BPM | HEIGHT: 63 IN | BODY MASS INDEX: 31.36 KG/M2

## 2022-09-20 DIAGNOSIS — N32.81 OVERACTIVE BLADDER: ICD-10-CM

## 2022-09-20 DIAGNOSIS — E78.00 PURE HYPERCHOLESTEROLEMIA, UNSPECIFIED: ICD-10-CM

## 2022-09-20 DIAGNOSIS — R35.1 NOCTURIA: ICD-10-CM

## 2022-09-20 DIAGNOSIS — N39.3 STRESS INCONTINENCE (FEMALE) (MALE): ICD-10-CM

## 2022-09-20 DIAGNOSIS — Z78.9 OTHER SPECIFIED HEALTH STATUS: ICD-10-CM

## 2022-09-20 DIAGNOSIS — Z87.19 PERSONAL HISTORY OF OTHER DISEASES OF THE DIGESTIVE SYSTEM: ICD-10-CM

## 2022-09-20 DIAGNOSIS — M06.9 RHEUMATOID ARTHRITIS, UNSPECIFIED: ICD-10-CM

## 2022-09-20 PROCEDURE — 99205 OFFICE O/P NEW HI 60 MIN: CPT | Mod: 25

## 2022-09-20 PROCEDURE — 51701 INSERT BLADDER CATHETER: CPT

## 2022-09-20 RX ORDER — TROSPIUM CHLORIDE 60 MG/1
60 CAPSULE, EXTENDED RELEASE ORAL
Qty: 90 | Refills: 3 | Status: COMPLETED | COMMUNITY
Start: 2021-07-27 | End: 2021-07-31

## 2022-09-20 RX ORDER — BACLOFEN 10 MG/1
10 TABLET ORAL
Refills: 0 | Status: DISCONTINUED | COMMUNITY
End: 2022-09-20

## 2022-09-20 RX ORDER — SULFAMETHOXAZOLE AND TRIMETHOPRIM 800; 160 MG/1; MG/1
800-160 TABLET ORAL TWICE DAILY
Qty: 14 | Refills: 0 | Status: COMPLETED | COMMUNITY
Start: 2021-08-02 | End: 2022-09-20

## 2022-09-20 RX ORDER — MELOXICAM 15 MG/1
15 TABLET ORAL
Refills: 0 | Status: DISCONTINUED | COMMUNITY
End: 2022-09-20

## 2022-09-20 NOTE — COUNSELING
[FreeTextEntry1] : Please follow up with the physician assistant in 6-8 weeks.\par \par Place a pea size (0.5 grams or less) dab of Estrogen vaginal cream using finger (NOT the applicator) into the vagina 3 times a week ( Monday, Wednesday, Friday)\par \par For Urgency, Frequency and Urge related incontinence.\par \par Please decrease or stop the use of the following:\par \par 1. Coffee (Caffeinated and decaffeinated)\par \par 2. Teas (Caffeinated, decaffeinated, Ice tea, and green teas\par \par 3. All sodas (Caffeinated, decaffeinated, energy drinks)\par \par 4. All carbonated drinks including seltzer water\par \par 5. All citric fruit juices\par \par 6. Water with lemon or lime\par \par 7. Spicy foods\par \par 8. Tomato Sauce based foods\par \par 9. Chocolate and chocolate containing products\par \par 10. All alcohol\par \par \par Please perform Kegel exercises.\par

## 2022-09-20 NOTE — ASSESSMENT
[FreeTextEntry1] : Overactive bladder syndrome -\par Discussed etiology of the condition with the patient. Discussed management options, including first line management options consisting of diet and lifestyle modifications, second line options consisting of medications, and third line options. Patient will start with bladder diet and pelvic floor exercises. She will also start vaginal estrogen cream. She will return in about 6-8 weeks.\par \par Vaginal atrophy -\par Discussed etiology and treatment options with patient. Discussed R/B/A of estrogen vaginal cream. Patient will start using as follows:\par Place a pea size (0.5 grams or less) dab of Estrogen vaginal cream using finger (NOT the applicator) into the vagina 3 times a week ( Monday, Wednesday, Friday)\par \par Stress urinary incontinence -\par Not as bothersome to the patient. Will reevaluate once her urgency symptoms are well controlled.

## 2022-09-20 NOTE — PHYSICAL EXAM
[Chaperone Present] : A chaperone was present in the examining room during all aspects of the physical examination [FreeTextEntry1] : Void: 25 cc\par \par PVR:30  cc\par \par Urethra was prepped in sterile fashion and then a sterile 14F catheter was used by me to drain the bladder for her symptoms of urinary urgency. Patient tolerated the procedure well\par \par neg empty cough stress test\par \par + atrophy\par \par no urethral caruncle\par \par no vestibular tenderness\par \par no prolapse\par \par + urethral hypermobility\par \par no pelvic floor dysfunction\par \par no urethral tenderness\par \par no bladder tenderness\par \par normal appearing cervix\par \par normal sized uterus, nontender\par \par adnexa nonpalpable\par \par good sphincter tone\par \par no enterocele\par \par good rectal squeeze\par \par intact sacral nerves\par \par 2/5 Kegel

## 2022-09-20 NOTE — HISTORY OF PRESENT ILLNESS
[FreeTextEntry1] : 67 year para 2 (c/s x2) presents with complaints of leakage and urgency with urination for over a year.\par She was previously prescribed Sanctura by Dr. Mckinney which she never took. She did take Azo occasionally which helps.\par Currently on Weight Watchers and drinking large quantities of water.\par \par 8/3/21 - Renal US: WNL\par \par Pelvic organ prolapse: no bulge, no pressure/heaviness\par \par Stress urinary incontinence: 2 x/week  no prior incontinence procedures\par \par Overactive bladder syndrome: daily frequency 10-12x/day, 2-4x/night,  + urgency,  4-5 x/week UUI episodes,    1 pads/day     Bladder irritants include coffee (4 cups),    Prior OAB meds no\par \par Voiding dysfunction: no Incomplete bladder emptying, no hesitancy\par \par Lower urinary tract/vaginal symptoms: no UTIs per year, no hematuria, no dysuria, no bladder pain\par \par 7 BM/week   no constipation   Fecal incontinence no\par \par Sexually active no    Pelvic pain no   Vaginal dryness +   LMP age 50   PMB no\par \par PMSH:\par spinal stenosis, receives epidurals

## 2022-09-22 LAB
APPEARANCE: CLEAR
BILIRUBIN URINE: NEGATIVE
BLOOD URINE: NEGATIVE
COLOR: NORMAL
GLUCOSE QUALITATIVE U: NEGATIVE
KETONES URINE: NEGATIVE
LEUKOCYTE ESTERASE URINE: NEGATIVE
NITRITE URINE: NEGATIVE
PH URINE: 7
PROTEIN URINE: NEGATIVE
SPECIFIC GRAVITY URINE: 1.01
UROBILINOGEN URINE: NORMAL

## 2022-09-23 LAB — URINE CULTURE <10: NORMAL

## 2022-11-03 NOTE — ED PROVIDER NOTE - CARE PLAN
Daily Note     Today's date: 11/3/2022  Patient name: Leslye Siddiqui  : 1997  MRN: [id number]  Referring provider: Jason Santos DO  Dx: No diagnosis found  Subjective: Pt reports continued improvement in her symptoms  Objective: See treatment diary below    Objective: See treatment diary below    AROM left wrist E/F- wrist/digits- [facility]  Strength-  II- L/R- 55/70; 3 RITA- ; Key- - all painful radial wrist  Palpation- tender 1st dorsal compartment/dorsal wrist    Crepitation notes with active thumb E/F; (+) Finkelstein  Pt provided comfort cool for support/restriction  Instructed in activity modification    Assessment: Tolerated treatment well today  Continue with current program   Plan: Progress treatment as tolerated         Precautions: avoid provocative positions and activity      Manuals 10/10 10/13 10/17 10/31 11/3        STM 15 15 15 15 15                     Comfort cool            Med+                         Neuro Re-Ed             HP/pulsed biph 15 15 15 15 15                                  Ther Ex                                                                                                                                                                                                   Modalities             US 15 15 15 15 15        CP 15 15 15 15 15 Principal Discharge DX:	Fall  Secondary Diagnosis:	Laceration of scalp

## 2022-11-29 ENCOUNTER — APPOINTMENT (OUTPATIENT)
Dept: UROGYNECOLOGY | Facility: CLINIC | Age: 67
End: 2022-11-29

## 2022-11-29 VITALS
SYSTOLIC BLOOD PRESSURE: 133 MMHG | HEIGHT: 63 IN | HEART RATE: 84 BPM | BODY MASS INDEX: 31.01 KG/M2 | DIASTOLIC BLOOD PRESSURE: 82 MMHG | WEIGHT: 175 LBS

## 2022-11-29 PROCEDURE — 99214 OFFICE O/P EST MOD 30 MIN: CPT

## 2022-11-29 RX ORDER — ESTRADIOL 0.1 MG/G
0.1 CREAM VAGINAL
Qty: 1 | Refills: 6 | Status: COMPLETED | COMMUNITY
Start: 2022-09-20 | End: 2022-11-29

## 2022-12-02 NOTE — HISTORY OF PRESENT ILLNESS
[FreeTextEntry1] : Patient is here for 2 months follow up for vaginal atrophy and urge incontinence.\par Last seen on 9/20/2022 as a new patient\par \par From initial visit: 9/20/2022\par 67 year para 2 (c/s x2) presents with complaints of leakage and urgency with urination for over a year.\par She was previously prescribed Sanctura by Dr. Mckinney which she never took. She did take Azo occasionally which helps.\par Currently on Weight Watchers and drinking large quantities of water.\par \par 8/3/21 - Renal US: WNL\par \par Pelvic organ prolapse: no bulge, no pressure/heaviness\par \par Stress urinary incontinence: 2 x/week no prior incontinence procedures\par \par Overactive bladder syndrome: daily frequency 10-12x/day, 2-4x/night, + urgency, 4-5 x/week UUI episodes, 1 pads/day Bladder irritants include coffee (4 cups), Prior OAB meds no\par \par Voiding dysfunction: no Incomplete bladder emptying, no hesitancy\par \par Lower urinary tract/vaginal symptoms: no UTIs per year, no hematuria, no dysuria, no bladder pain\par \par 7 BM/week no constipation Fecal incontinence no\par \par Sexually active no Pelvic pain no Vaginal dryness + LMP age 50 PMB no\par \par PMSH:\par spinal stenosis, receives epidurals \par \par \par Today, patient states she is happy with dietary changes and is noticing improvement. Has gone down to one cup of coffee with improvement in her urgency. Denies side effects. Patient does not feel she has an infection.\par \par Patient did not start estrace as she is scared due to the risk of cancer. Has not been using azo as she felt that caused weight gain. \par \par She noticed a "boil" near the opening of her vagina that popped on Thanksgiving and would like an exam. She notes that the "boil" released clear discharge with some blood mixed in. \par \par \par

## 2022-12-02 NOTE — DISCUSSION/SUMMARY
[FreeTextEntry1] : \par Urge incontinence of urine\par Counseled the patient on the options including dietary changes, increasing water intake, pelvic floor physical therapy, estrogen cream usage and medication management. Discussed risks and benefits of Trospium and Myrbetriq. Patient elects to continue with dietary changes\par \par Discussed exam findings with the patient and recommended further evaluation with her gyn, patient sees Dr. Ssoa\par \par Patient will call the office if she elects to start estrace cream or would like a bladder medication\par \par

## 2022-12-02 NOTE — COUNSELING
[FreeTextEntry1] : \par Please follow up with your gynecologist\par \par Please call the office if you decide to start the estrace cream or would like to try a bladder medication\par \par Call with any issues\par \par Follow up as needed

## 2022-12-02 NOTE — PHYSICAL EXAM
[Chaperone Present] : A chaperone was present in the examining room during all aspects of the physical examination [No Acute Distress] : in no acute distress [Well developed] : well developed [Well Nourished] : ~L well nourished [FreeTextEntry1] : \par On exam: 7 o clock- flattened, mildly fluctuant, minimally tender cyst under the skin, non erythematous, small slit like opening, not actively bleeding

## 2023-01-13 DIAGNOSIS — Z92.89 PERSONAL HISTORY OF OTHER MEDICAL TREATMENT: ICD-10-CM

## 2023-01-13 DIAGNOSIS — Z78.0 ASYMPTOMATIC MENOPAUSAL STATE: ICD-10-CM

## 2023-01-13 DIAGNOSIS — Z01.419 ENCOUNTER FOR GYNECOLOGICAL EXAMINATION (GENERAL) (ROUTINE) W/OUT ABNORMAL FINDINGS: ICD-10-CM

## 2023-01-13 DIAGNOSIS — Z82.49 FAMILY HISTORY OF ISCHEMIC HEART DISEASE AND OTHER DISEASES OF THE CIRCULATORY SYSTEM: ICD-10-CM

## 2023-01-13 DIAGNOSIS — Z80.3 FAMILY HISTORY OF MALIGNANT NEOPLASM OF BREAST: ICD-10-CM

## 2023-01-13 DIAGNOSIS — Z78.9 OTHER SPECIFIED HEALTH STATUS: ICD-10-CM

## 2023-01-26 ENCOUNTER — APPOINTMENT (OUTPATIENT)
Dept: OBGYN | Facility: CLINIC | Age: 68
End: 2023-01-26
Payer: MEDICARE

## 2023-01-26 VITALS — HEART RATE: 96 BPM | SYSTOLIC BLOOD PRESSURE: 123 MMHG | HEIGHT: 63 IN | DIASTOLIC BLOOD PRESSURE: 83 MMHG

## 2023-01-26 DIAGNOSIS — Z12.39 ENCOUNTER FOR OTHER SCREENING FOR MALIGNANT NEOPLASM OF BREAST: ICD-10-CM

## 2023-01-26 DIAGNOSIS — Z01.419 ENCOUNTER FOR GYNECOLOGICAL EXAMINATION (GENERAL) (ROUTINE) W/OUT ABNORMAL FINDINGS: ICD-10-CM

## 2023-01-26 LAB
BILIRUB UR QL STRIP: NORMAL
CLARITY UR: CLEAR
COLLECTION METHOD: NORMAL
GLUCOSE UR-MCNC: NORMAL
HCG UR QL: 0.2 EU/DL
HGB UR QL STRIP.AUTO: NORMAL
KETONES UR-MCNC: NORMAL
LEUKOCYTE ESTERASE UR QL STRIP: NORMAL
NITRITE UR QL STRIP: NORMAL
PH UR STRIP: 7
PROT UR STRIP-MCNC: NORMAL
SP GR UR STRIP: 1.01

## 2023-01-26 PROCEDURE — 81003 URINALYSIS AUTO W/O SCOPE: CPT | Mod: QW

## 2023-01-26 PROCEDURE — 99397 PER PM REEVAL EST PAT 65+ YR: CPT

## 2023-01-26 RX ORDER — MULTIVITAMIN
TABLET ORAL
Refills: 0 | Status: ACTIVE | COMMUNITY

## 2023-01-26 RX ORDER — RED YEAST RICE EXTRACT
POWDER (GRAM) MISCELLANEOUS
Refills: 0 | Status: ACTIVE | COMMUNITY

## 2023-01-26 RX ORDER — CA/D3/MAG OX/ZINC/COP/MANG/BOR 600 MG-800
250 MCG TABLET,CHEWABLE ORAL
Refills: 0 | Status: ACTIVE | COMMUNITY

## 2023-01-26 RX ORDER — UBIDECARENONE 200 MG
CAPSULE ORAL
Refills: 0 | Status: ACTIVE | COMMUNITY

## 2023-01-26 RX ORDER — ASCORBIC ACID 500 MG
TABLET ORAL
Refills: 0 | Status: ACTIVE | COMMUNITY

## 2023-01-26 NOTE — HISTORY OF PRESENT ILLNESS
[FreeTextEntry1] : patient is here for annual exam.. Patient complains of urinary frequency and urgency . she had been prescribed vaginal estrogen but was afraid to use it.

## 2023-01-30 LAB — CYTOLOGY CVX/VAG DOC THIN PREP: ABNORMAL

## 2023-02-02 LAB — HPV HIGH+LOW RISK DNA PNL CVX: NOT DETECTED

## 2023-11-11 NOTE — ED PROCEDURE NOTE - CPROC ED POST PROC CARE GUIDE1
Pt in restraint. Pt kicking and attempting to hit staff. Haldol was given. Verbal/written post procedure instructions were given to patient/caregiver./Instructed patient/caregiver to follow-up with primary care physician./Instructed patient/caregiver regarding signs and symptoms of infection./Keep the cast/splint/dressing clean and dry.

## 2023-12-15 NOTE — PATIENT PROFILE ADULT - NSPROGENPREVTRANSF_GEN_A_NUR
Lactation Consultant Note  Lactation Consultation  Reason for Consult: Initial assessment  Consultant Name: JAY Mon    Maternal Information  Has mother  before?: No  Infant to breast within first 2 hours of birth?: Yes    Maternal Assessment  Breast Assessment: Medium, Soft  Areola Assessment: Normal    Infant Assessment  Infant Behavior: Deep sleep, Content after feeding    Feeding Assessment       LATCH TOOL       Breast Pump       Other OB Lactation Tools       Patient Follow-up  Inpatient Lactation Follow-up Needed : Yes    Other OB Lactation Documentation       Recommendations/Summary  Infant had just fed and is post circumcision. Mother reports that infant had been latching easily and comfortably before the circumcision. Discussed normal expectations for first few days. All questions answered. Mother to call for a latch check.    no

## 2024-02-27 ENCOUNTER — APPOINTMENT (OUTPATIENT)
Dept: UROGYNECOLOGY | Facility: CLINIC | Age: 69
End: 2024-02-27
Payer: MEDICARE

## 2024-02-27 VITALS
BODY MASS INDEX: 31.01 KG/M2 | WEIGHT: 175 LBS | HEART RATE: 65 BPM | HEIGHT: 63 IN | DIASTOLIC BLOOD PRESSURE: 78 MMHG | SYSTOLIC BLOOD PRESSURE: 131 MMHG

## 2024-02-27 DIAGNOSIS — N39.41 URGE INCONTINENCE: ICD-10-CM

## 2024-02-27 DIAGNOSIS — N95.2 POSTMENOPAUSAL ATROPHIC VAGINITIS: ICD-10-CM

## 2024-02-27 PROCEDURE — 99214 OFFICE O/P EST MOD 30 MIN: CPT

## 2024-02-27 RX ORDER — ROSUVASTATIN CALCIUM 5 MG/1
5 TABLET, FILM COATED ORAL
Refills: 0 | Status: ACTIVE | COMMUNITY

## 2024-02-27 RX ORDER — CELECOXIB 50 MG/1
CAPSULE ORAL
Refills: 0 | Status: ACTIVE | COMMUNITY

## 2024-02-27 RX ORDER — SIMVASTATIN 20 MG/1
20 TABLET, FILM COATED ORAL
Refills: 0 | Status: COMPLETED | COMMUNITY
End: 2024-02-27

## 2024-02-27 RX ORDER — DICLOFENAC SODIUM 75 MG/1
75 TABLET, DELAYED RELEASE ORAL
Refills: 0 | Status: COMPLETED | COMMUNITY
End: 2024-02-27

## 2024-02-27 NOTE — DISCUSSION/SUMMARY
[FreeTextEntry1] :  Urge incontinence Counseled the patient on the options including elimination of dietary bladder irritants, pelvic floor physical therapy, medication management and third line procedures. Discussed risks and benefits of anticholinergics and beta agonists. Does not want to try anticholinergics due to side effects and risk of memory loss. Discussed third line procedures including intravesicular Botox injections, PTNS, and sacral neuromodulation. Patient agrees to try Gemtesa. Rx Gemtesa 75 mg. Precautions reviewed. Will return for follow up in 6-8 weeks or earlier if she has any issues   Atrophic vaginitis We reviewed the risks, benefits, alternatives and indications of local estrogen therapy and I gave her a handout that covers this information. She does not opt to begin this therapy.

## 2024-02-27 NOTE — HISTORY OF PRESENT ILLNESS
[FreeTextEntry1] : Patient is here for follow up visit for urge incontinence. Last seen on 11/29/2022 for follow up visit for urge incontinence  From initial visit: 9/20/2022 67 year para 2 (c/s x2) presents with complaints of leakage and urgency with urination for over a year. She was previously prescribed Sanctura by Dr. Mckinney which she never took. She did take Azo occasionally which helps. Currently on Weight Watchers and drinking large quantities of water. 8/3/21 - Renal US: WNL Pelvic organ prolapse: no bulge, no pressure/heaviness Stress urinary incontinence: 2 x/week no prior incontinence procedures Overactive bladder syndrome: daily frequency 10-12x/day, 2-4x/night, + urgency, 4-5 x/week UUI episodes, 1 pads/day Bladder irritants include coffee (4 cups), Prior OAB meds no Voiding dysfunction: no Incomplete bladder emptying, no hesitancy Lower urinary tract/vaginal symptoms: no UTIs per year, no hematuria, no dysuria, no bladder pain 7 BM/week no constipation Fecal incontinence no Sexually active no Pelvic pain no Vaginal dryness + LMP age 50 PMB no PMSH: spinal stenosis, receives epidurals  Today, patient states she tried some dietary changes and noticed a small improvement. Also has been taking "bladder support" supplement without significant benefit. She is bothered by urgency, frequency as well as leakage of urine. She would like to hear about different options. Also wants more information regarding estrogen cream. Denies UTI symptoms today. She feels that she empties her bladder well.

## 2024-02-27 NOTE — COUNSELING
[FreeTextEntry1] : If you feel like you have an infection it is important for you to call our office and we will arrange testing of your urine.   Please begin taking Gemtesa 75 mg once a day. It takes up to 6 weeks to go into full effect. Please  your refill when you complete the 1st bottle.  Please call my office if you have any issues with the cost or side effects of the medication.   Schedule a 6-8 week follow up med check appointment.

## 2024-02-27 NOTE — REASON FOR VISIT
[TextEntry] : Reason for visit: Follow Up  Voids per day: 8-10    Voids per night:  2-3  Urge incontinence: Yes (+) leakage, Occasional (+) urgency Stress incontinence:  Occasional Constipation:   No Fecal incontinence: No Vaginal bulge: No

## 2024-02-29 ENCOUNTER — APPOINTMENT (OUTPATIENT)
Dept: OBGYN | Facility: CLINIC | Age: 69
End: 2024-02-29
Payer: MEDICARE

## 2024-02-29 VITALS
HEART RATE: 89 BPM | BODY MASS INDEX: 31.01 KG/M2 | WEIGHT: 175 LBS | SYSTOLIC BLOOD PRESSURE: 118 MMHG | HEIGHT: 63 IN | DIASTOLIC BLOOD PRESSURE: 78 MMHG

## 2024-02-29 DIAGNOSIS — Z01.419 ENCOUNTER FOR GYNECOLOGICAL EXAMINATION (GENERAL) (ROUTINE) W/OUT ABNORMAL FINDINGS: ICD-10-CM

## 2024-02-29 PROCEDURE — 99397 PER PM REEVAL EST PAT 65+ YR: CPT

## 2024-02-29 PROCEDURE — 81003 URINALYSIS AUTO W/O SCOPE: CPT | Mod: QW

## 2024-02-29 RX ORDER — DULOXETINE HYDROCHLORIDE 60 MG/1
60 CAPSULE, DELAYED RELEASE ORAL
Refills: 0 | Status: ACTIVE | COMMUNITY

## 2024-02-29 RX ORDER — FOLIC ACID 20 MG
CAPSULE ORAL
Refills: 0 | Status: ACTIVE | COMMUNITY

## 2024-02-29 RX ORDER — METHOTREXATE 2.5 MG/1
2.5 TABLET ORAL
Refills: 0 | Status: ACTIVE | COMMUNITY

## 2024-02-29 RX ORDER — ROSUVASTATIN CALCIUM 10 MG/1
10 TABLET, FILM COATED ORAL
Refills: 0 | Status: ACTIVE | COMMUNITY

## 2024-02-29 NOTE — HISTORY OF PRESENT ILLNESS
[FreeTextEntry1] : patient is here for annual exam History of overactive bladder sees urogynecologist Dr Nayak

## 2024-03-27 LAB
CYTOLOGY CVX/VAG DOC THIN PREP: ABNORMAL
HPV HIGH+LOW RISK DNA PNL CVX: NOT DETECTED

## 2024-04-26 NOTE — CONSULT NOTE ADULT - NSTELEHEALTH_GEN_ALL_CORE
PMHx: H+P per hospital 3/21/24 Tara Fernandes is a 89-year-old female with history of HTN, orthostatic hypotension, vertebral compression fracture, and left leg DVT who presents to the emergency department after syncope and collapse.  She woke up shortly thereafter, was unable to stand independently, and she scooted over to her phone and called her daughter who brought her up and drove her into the emergency department and is present at bedside.  Consequently her sacrum is sore.  Her daughter messaged the primary care physician, Dr. Hayder Urena, and he recommended going to the emergency room for studies.  She hit her left elbow and has a skin tear.  At home, she gets around with a walker, needs assistance for standing but is able to walk down 4 stairs in front of her house.  Lives alone, but her daughter lives next-door.  She has gotten intermittent home physical therapy, recently discharged from them  List of Comorbidities:   Bladder incontinence  Arthritis  HTN  DVT  Impaired mobility  Pain in wrist  Ankle joint pain    SUBJECTIVE: I am doing ok today   LIVING SITUATION: Pt lives alone in a mutli level home. Pt bedroom and bathroom are on the first floor. Pt has 4 step to enter the home with B wide railings. Pt daughter lives next door and checks on her mom multiple times a day   REQUIRES CAREGIVER ASSISTANCE FOR: transportation, medications, ADLS, IADLS   PLOF: Pt was I with amb with . Pt needs A with dressing and bathing   MEDICATIONS REVIEWED AND RECONCILED: no changes   NEXT MD APPT: , TBD   EQUIPMENT: shower [facility] bed, 3 in 1 commofe, grab bars   ROM: Decreased B knee extenison -10 degrees each.  B DF to neutral only . Pt has severe arthritis in L knee with crepitus   STRENGTH:  B hip flexors 3/5 B quads and hamstrings +3/5 B DF/PF +4/5   WOUNDS:none   BED MOBILITY:supine<> sit  MOD I increased time needed to complete task   TRANSFERS:sit to stand from recliner chair with it 
No

## 2024-06-03 ENCOUNTER — APPOINTMENT (OUTPATIENT)
Dept: UROGYNECOLOGY | Facility: CLINIC | Age: 69
End: 2024-06-03
Payer: MEDICARE

## 2024-06-03 VITALS
BODY MASS INDEX: 33.49 KG/M2 | HEART RATE: 68 BPM | HEIGHT: 63 IN | DIASTOLIC BLOOD PRESSURE: 85 MMHG | WEIGHT: 189 LBS | SYSTOLIC BLOOD PRESSURE: 132 MMHG

## 2024-06-03 DIAGNOSIS — R35.0 FREQUENCY OF MICTURITION: ICD-10-CM

## 2024-06-03 DIAGNOSIS — R31.29 OTHER MICROSCOPIC HEMATURIA: ICD-10-CM

## 2024-06-03 DIAGNOSIS — R39.15 URGENCY OF URINATION: ICD-10-CM

## 2024-06-03 PROCEDURE — 51701 INSERT BLADDER CATHETER: CPT

## 2024-06-03 PROCEDURE — 99214 OFFICE O/P EST MOD 30 MIN: CPT | Mod: 25

## 2024-06-03 PROCEDURE — 99459 PELVIC EXAMINATION: CPT

## 2024-06-03 RX ORDER — VIBEGRON 75 MG/1
75 TABLET, FILM COATED ORAL
Qty: 90 | Refills: 1 | Status: ACTIVE | COMMUNITY
Start: 2024-02-27 | End: 1900-01-01

## 2024-06-03 RX ORDER — EZETIMIBE 10 MG/1
10 TABLET ORAL
Refills: 0 | Status: COMPLETED | COMMUNITY
End: 2024-06-03

## 2024-06-03 NOTE — PHYSICAL EXAM
[Chaperone Present] : A chaperone was present in the examining room during all aspects of the physical examination [[id number]] : A chaperone was present during the pelvic exam. [No Acute Distress] : in no acute distress [Well developed] : well developed [Well Nourished] : ~L well nourished [FreeTextEntry2] : Nya VELOZ [FreeTextEntry1] : Indication: Microscopic hematuria Urethra was prepped in sterile fashion and then a sterile non indwelling catheter (14F) was used by me to drain the bladder. The patient tolerated the procedure well.  cath: 80cc

## 2024-06-03 NOTE — DISCUSSION/SUMMARY
[FreeTextEntry1] : Urinary frequency/urgency Patient happy with Gemtesa 75 mg QD. Refills provided. 90 days supply with 1 refill. Precautions reviewed. Will return in 6 months for follow up or earlier if she has any issues.   Microscopic hematuria We will contact you with urine results.

## 2024-06-03 NOTE — HISTORY OF PRESENT ILLNESS
[FreeTextEntry1] : Patient is here for 6 months med check for urge incontinence. Last seen on 2/27/24 for med check.   From initial visit: 9/20/2022 67 year para 2 (c/s x2) presents with complaints of leakage and urgency with urination for over a year. She was previously prescribed Sanctura by Dr. Mckinney which she never took. She did take Azo occasionally which helps. Currently on Weight [employer] and drinking large quantities of water. 8/3/21 - Renal US: WNL  PMSH: spinal stenosis, receives epidurals  Opted out of estrace cream  Gemtesa 75mg QD  3/28/24, U/A: RBCs: 3-10, +0-5 squamous epithelial cells  Today, patient states she is happy with Gemtesa 75 mg QD and is noticing improvement. Feels 95% better, c/o weight gain, asking if it's from Gemtesa. Denies side effects. Patient does not feel she has an infection. States that she saw PCP 3 months ago and her urine was tested routinely and found to have microscopic blood in the urine.

## 2024-06-03 NOTE — REASON FOR VISIT
[TextEntry] : Reason for visit: 6-8 Week Medication Check Voids per day: 8-10  Voids per night: 2    Urge incontinence: Rarely (+) urgency Stress incontinence: Occasionally (+) sneeze  Constipation: No Fecal incontinence: No Vaginal bulge: No

## 2024-06-03 NOTE — COUNSELING
[FreeTextEntry1] : If you feel like you have an infection it is important for you to call our office and we will arrange testing of your urine.  We will contact you if the urine results are abnormal.  Please continue taking Gemtesa 75mg for frequency. Refills sent to your pharmacy.  Please call my office if you have any issues with the cost or side effects of the medication.   Schedule a 6 months follow up med check appointment with LAXMI Ponce.

## 2024-06-13 LAB — URINE CULTURE <10: NORMAL

## 2024-12-02 ENCOUNTER — APPOINTMENT (OUTPATIENT)
Dept: UROGYNECOLOGY | Facility: CLINIC | Age: 69
End: 2024-12-02
Payer: MEDICARE

## 2024-12-02 VITALS
HEART RATE: 76 BPM | HEIGHT: 63 IN | DIASTOLIC BLOOD PRESSURE: 87 MMHG | WEIGHT: 189 LBS | BODY MASS INDEX: 33.49 KG/M2 | SYSTOLIC BLOOD PRESSURE: 144 MMHG

## 2024-12-02 DIAGNOSIS — N39.41 URGE INCONTINENCE: ICD-10-CM

## 2024-12-02 PROCEDURE — 99214 OFFICE O/P EST MOD 30 MIN: CPT

## 2024-12-02 RX ORDER — EZETIMIBE 10 MG/1
10 TABLET ORAL
Refills: 0 | Status: ACTIVE | COMMUNITY

## 2025-06-11 NOTE — PROGRESS NOTE ADULT - SUBJECTIVE AND OBJECTIVE BOX
Will contact you/patient when the test(s):   Urine culture result(s) are final and with further recommendations then if any changes.    Recommend:    Keeping well hydrated    Medication(s): cephalexin for UTI    Follow-up:  With me pending the test results or sooner as needed.    Go to the emergency room or call 911 for any new or suddenly worsening symptoms, any signs of acute distress, or emergent changes.    
65 F with PMH HLD, thymoma (s/p robotic thymomectomy), c-sections x2, presents with 1 day of worsening RLQ abdominal pain that radiates to the umbilicus. She reports nausea earlier today but thought it was due to something she ate and tried to make herself vomit to relieve her pain. This did not help and she says that her pain has been getting worse throughout the day, prompting her to come to the ED for evaluation. Of note, she also has tested positive for COVID-19 13 days ago and had a positive test on admission to the ED. She does report cough and intermittent fever over the past 2 weeks, but was afebrile upon arrival to the ED    PAST MEDICAL & SURGICAL HISTORY:  History of thymoma  Hyperlipidemia  History of thymectomy  H/O  section    MEDICATIONS  (STANDING):  heparin   Injectable 5000 Unit(s) SubCutaneous every 8 hours  lactated ringers. 1000 milliLiter(s) (125 mL/Hr) IV Continuous <Continuous>  pantoprazole  Injectable 40 milliGRAM(s) IV Push daily  piperacillin/tazobactam IVPB. 3.375 Gram(s) IV Intermittent once  piperacillin/tazobactam IVPB.. 3.375 Gram(s) IV Intermittent every 8 hours    MEDICATIONS  (PRN):    Vital Signs Last 24 Hrs  T(C): 36.4 (09 Dec 2020 03:39), Max: 37.3 (08 Dec 2020 19:13)  T(F): 97.6 (09 Dec 2020 03:39), Max: 99.2 (08 Dec 2020 19:13)  HR: 79 (09 Dec 2020 03:39) (79 - 90)  BP: 129/73 (09 Dec 2020 03:39) (114/67 - 135/70)  BP(mean): --  RR: 20 (09 Dec 2020 03:39) (20 - 20)  SpO2: 97% (09 Dec 2020 03:39) (93% - 98%)  Physical Examination:     Constitutional: well-nourished, appears stated age, no acute distress.  HEENT: Airway patent, protected.   CV: regular rate, regular rhythm, well-perfused extremities, 2+ b/l DP and radial pulses equal.  Lungs: BCTA, no increased WOB.  ABD: soft, ttp over the RLQ and slight ttp over periumbilical region, ND, no guarding or rebound, no pulsatile mass, no hernias.   MSK: Neck supple, nontender, nl ROM, no stepoff. Chest nontender. Back nontender in TLS spine or to b/l bony structures or flanks. Ext nontender, nl rom, no deformity.   INTEG: Skin warm, dry, no rash.  NEURO: A&Ox3, moving all extremities, normal speech  PSYCH: Calm, cooperative, normal affect and interaction                                     11.9   9.23  )-----------( 289      ( 08 Dec 2020 22:41 )             36.5   12-    136  |  102  |  10  ----------------------------<  82  3.7   |  23  |  0.6<L>    Ca    8.2<L>      08 Dec 2020 22:41  Phos  2.6       Mg     2.0         TPro  7.0  /  Alb  3.9  /  TBili  0.6  /  DBili  <0.2  /  AST  65<H>  /  ALT  56<H>  /  AlkPhos  43                Flu With COVID-19 By BONIFACIO (20 @ 11:40)   SARS-CoV-2 Result: Positive: This Respiratory Panel uses polymerase chain reaction (PCR) to detect for   influenza A; influenza B; respiratory syncytial virus; and SARS-CoV-2.   This test was validated by [facility] and is in use under the FDA   Emergency Use Authorization (EUA) for clinical labs CLIA-certified to   perform high complexity testing. Test results should be correlated with   clinical presentation, patient history, and epidemiology. Counts   Influenza A Result: Negative Counts   Influenza B Result: Negative Counts   Resp Syn Virus Result: Negative Counts       EXAM:  CT ABDOMEN AND PELVIS IC            PROCEDURE DATE:  2020            INTERPRETATION:  CLINICAL STATEMENT: Right lower quadrant abdominal pain. Evaluate for appendicitis    TECHNIQUE: Contiguous axial CT images were obtained from the lower chest to the pubic symphysis following administration of 100cc Optiray 320 intravenous contrast.  Oral contrast was not administered.  Reformatted images in the coronal and sagittal planes were acquired.    COMPARISON CT: None.    OTHER STUDIES USED FOR CORRELATION: None.      FINDINGS:    LOWER CHEST: Possible 1.3 cm right lower lobe nodule versus dependent atelectasis (5/5). Additional bibasilar subsegmental dependent atelectasis    HEPATOBILIARY: Unremarkable.    SPLEEN: Unremarkable.    PANCREAS: Unremarkable.    ADRENAL GLANDS: Unremarkable.    KIDNEYS: Symmetric renal enhancement bilaterally. No hydronephrosis.    ABDOMINOPELVIC NODES: No bulky lymphadenopathy.    PELVIC ORGANS: Unremarkable.    PERITONEUM/MESENTERY/BOWEL: Thick-walled prominent appendix measuring 1 cm diameter with adjacent fat stranding and trace fluid. No evidence of pneumoperitoneum or abscess formation. Small hiatal hernia. Scattered colonic diverticulosis, without evidence for acute diverticulitis. Moderatediffuse colonic stool burden.    BONES/SOFT TISSUES: Degenerative changes of the spine and bilateral hips noted.  Tiny fat-containing umbilical hernia.    IMPRESSION:  1.  Acute appendicitis without evidence for pneumoperitoneum or abscess formation.  2.  Possible 1.3 cm right lower lobe nodule versus focal dependent atelectasis. An outpatient CT chest is recommended in 3 months to assess for resolution.        Dr. Galvan Spoke with KENROY BALL on 2020 4:08 PM with readback.              EL GARBER; Attending Radiologist  This document has been electronically signed. Dec  7 2020  4:09PM    
65 F with PMH HLD, thymoma (s/p robotic thymomectomy), c-sections x2, presents with 1 day of worsening RLQ abdominal pain that radiates to the umbilicus. She reports nausea earlier today but thought it was due to something she ate and tried to make herself vomit to relieve her pain. This did not help and she says that her pain has been getting worse throughout the day, prompting her to come to the ED for evaluation. Of note, she also has tested positive for COVID-19 13 days ago and had a positive test on admission to the ED. She does report cough and intermittent fever over the past 2 weeks, but was afebrile upon arrival to the ED    PAST MEDICAL & SURGICAL HISTORY:  History of thymoma  Hyperlipidemia  History of thymectomy  H/O  section    MEDICATIONS  (STANDING):  heparin   Injectable 5000 Unit(s) SubCutaneous every 8 hours  lactated ringers. 1000 milliLiter(s) (125 mL/Hr) IV Continuous <Continuous>  pantoprazole  Injectable 40 milliGRAM(s) IV Push daily  piperacillin/tazobactam IVPB. 3.375 Gram(s) IV Intermittent once  piperacillin/tazobactam IVPB.. 3.375 Gram(s) IV Intermittent every 8 hours    MEDICATIONS  (PRN):    Vital Signs Last 24 Hrs  T(C): 36.7 (10 Dec 2020 04:00), Max: 36.7 (10 Dec 2020 04:00)  T(F): 98 (10 Dec 2020 04:00), Max: 98 (10 Dec 2020 04:00)  HR: 80 (10 Dec 2020 04:00) (80 - 85)  BP: 112/64 (10 Dec 2020 04:00) (111/60 - 139/70)  BP(mean): --  RR: 18 (10 Dec 2020 04:00) (18 - 19)  SpO2: 96% (10 Dec 2020 04:00) (94% - 96%)      Physical Examination:     Constitutional: well-nourished, appears stated age, no acute distress.  HEENT: Airway patent, protected.   CV: regular rate, regular rhythm, well-perfused extremities, 2+ b/l DP and radial pulses equal.  Lungs: BCTA, no increased WOB.  ABD: soft,    MSK: Neck supple, nontender, nl ROM, no stepoff. Chest nontender. Back nontender in TLS spine or to b/l bony structures or flanks. Ext nontender, nl rom, no deformity.   INTEG: Skin warm, dry, no rash.  NEURO: A&Ox3, moving all extremities, normal speech  PSYCH: Calm, cooperative, normal affect and interaction                                     11.9   9.23  )-----------( 289      ( 08 Dec 2020 22:41 )             36.5   12-    136  |  102  |  10  ----------------------------<  82  3.7   |  23  |  0.6<L>    Ca    8.2<L>      08 Dec 2020 22:41  Phos  2.6       Mg     2.0         TPro  7.0  /  Alb  3.9  /  TBili  0.6  /  DBili  <0.2  /  AST  65<H>  /  ALT  56<H>  /  AlkPhos  43                Flu With COVID-19 By BONIFACIO (20 @ 11:40)   SARS-CoV-2 Result: Positive: This Respiratory Panel uses polymerase chain reaction (PCR) to detect for   influenza A; influenza B; respiratory syncytial virus; and SARS-CoV-2.   This test was validated by [employer] and is in use under the FDA   Emergency Use Authorization (EUA) for clinical labs CLIA-certified to   perform high complexity testing. Test results should be correlated with   clinical presentation, patient history, and epidemiology. Counts   Influenza A Result: Negative Counts   Influenza B Result: Negative Counts   Resp Syn Virus Result: Negative Counts       EXAM:  CT ABDOMEN AND PELVIS IC            PROCEDURE DATE:  2020            INTERPRETATION:  CLINICAL STATEMENT: Right lower quadrant abdominal pain. Evaluate for appendicitis    TECHNIQUE: Contiguous axial CT images were obtained from the lower chest to the pubic symphysis following administration of 100cc Optiray 320 intravenous contrast.  Oral contrast was not administered.  Reformatted images in the coronal and sagittal planes were acquired.    COMPARISON CT: None.    OTHER STUDIES USED FOR CORRELATION: None.      FINDINGS:    LOWER CHEST: Possible 1.3 cm right lower lobe nodule versus dependent atelectasis (5/5). Additional bibasilar subsegmental dependent atelectasis    HEPATOBILIARY: Unremarkable.    SPLEEN: Unremarkable.    PANCREAS: Unremarkable.    ADRENAL GLANDS: Unremarkable.    KIDNEYS: Symmetric renal enhancement bilaterally. No hydronephrosis.    ABDOMINOPELVIC NODES: No bulky lymphadenopathy.    PELVIC ORGANS: Unremarkable.    PERITONEUM/MESENTERY/BOWEL: Thick-walled prominent appendix measuring 1 cm diameter with adjacent fat stranding and trace fluid. No evidence of pneumoperitoneum or abscess formation. Small hiatal hernia. Scattered colonic diverticulosis, without evidence for acute diverticulitis. Moderatediffuse colonic stool burden.    BONES/SOFT TISSUES: Degenerative changes of the spine and bilateral hips noted.  Tiny fat-containing umbilical hernia.    IMPRESSION:  1.  Acute appendicitis without evidence for pneumoperitoneum or abscess formation.  2.  Possible 1.3 cm right lower lobe nodule versus focal dependent atelectasis. An outpatient CT chest is recommended in 3 months to assess for resolution.        Dr. Galvan Spoke with KENROY BALL on 2020 4:08 PM with readback.              EL GARBER; Attending Radiologist  This document has been electronically signed. Dec  7 2020  4:09PM        
GENERAL SURGERY PROGRESS NOTE     [address]  65y  Female  Hospital day :1d    OVERNIGHT EVENTS: no acute events overnight     T(F): 97.9 (20 @ 00:00), Max: 98.7 (20 @ 19:26)  HR: 83 (20 @ 00:00) (83 - 126)  BP: 110/64 (20 @ 00:00) (110/64 - 136/74)  RR: 20 (20 @ 00:00) (20 - 20)  SpO2: 98% (20 @ 00:00) (95% - 98%)    DIET/FLUIDS: lactated ringers. 1000 milliLiter(s) IV Continuous <Continuous>  potassium phosphate IVPB 15 milliMole(s) IV Intermittent once    GI proph:  pantoprazole  Injectable 40 milliGRAM(s) IV Push daily    AC/ proph: heparin   Injectable 5000 Unit(s) SubCutaneous every 8 hours    ABx: piperacillin/tazobactam IVPB. 3.375 Gram(s) IV Intermittent once  piperacillin/tazobactam IVPB.. 3.375 Gram(s) IV Intermittent every 8 hours      PHYSICAL EXAM:  GENERAL: NAD, well-appearing  ABDOMEN: Soft, RLQ tendferness  EXTREMITIES:  No clubbing, cyanosis, or edema      LABS  Labs:  CAPILLARY BLOOD GLUCOSE                              11.3   10.57 )-----------( 258      ( 08 Dec 2020 01:04 )             34.8       Auto Neutrophil %: 78.0 % (20 @ 01:04)  Auto Immature Granulocyte %: 0.3 % (20 @ 01:04)  Auto Neutrophil %: 89.6 % (20 @ 12:00)  Auto Immature Granulocyte %: 0.5 % (20 @ 12:00)        138  |  104  |  11  ----------------------------<  130<H>  3.7   |  24  |  0.6<L>      Calcium, Total Serum: 8.1 mg/dL (20 @ 01:04)      LFTs:             7.0  | 0.6  | 65       ------------------[43      ( 07 Dec 2020 12:00 )  3.9  | <0.2 | 56          Lipase:18     Amylase:x         Lactate, Blood: 1.3 mmol/L (20 @ 12:00)      Coags:    CARDIAC MARKERS ( 07 Dec 2020 12:00 )  x     / <0.01 ng/mL / x     / x     / x              Urinalysis Basic - ( 07 Dec 2020 12:58 )    Color: Yellow / Appearance: Clear / S.030 / pH: x  Gluc: x / Ketone: Small  / Bili: Negative / Urobili: <2 mg/dL   Blood: x / Protein: 30 mg/dL / Nitrite: Negative   Leuk Esterase: Negative / RBC: 5 /HPF / WBC 3 /HPF   Sq Epi: x / Non Sq Epi: 2 /HPF / Bacteria: Negative          
Progress Note: General Surgery  Patient: [address] , 65y (1955)Female   MRN: [id number]  Location: [address]  Visit: 20 Inpatient  Date: 20 @ 11:13    Admit Diagnosis/Chief Complaint:  acute appendicitis    Procedure/Diagnosis:  medical management for acute appendicitis    Events/ 24h: No acute events overnight. Pain controlled.    Vitals: T(F): 96.7 (20 @ 07:00), Max: 99.2 (20 @ 19:13)  HR: 87 (20 @ 07:00)  BP: 122/61 (20 @ 07:00) (114/67 - 135/70)  RR: 20 (20 @ 07:00)  SpO2: 97% (20 @ 07:00)    In:   20 @ 07:01  -  20 @ 07:00  --------------------------------------------------------  IN: 2575 mL      Out:   20 @ 07:01  -  20 @ 07:00  --------------------------------------------------------  OUT:    Voided (mL): 3 mL  Total OUT: 3 mL        Net:   20 @ 07:01  -  20 @ 07:00  --------------------------------------------------------  NET: 2572 mL        Diet: Diet, Regular:   DASH/TLC Sodium & Cholesterol Restricted (20 @ 22:33)    IV Fluids:     Physical Examination:  General Appearance: NAD   HEENT: EOMI, sclera non-icteric.  Heart: RRR   Lungs: CTABL.   Abdomen:  Soft, mildly tender, nondistended. no rebound or guarding  MSK/Extremities: Warm & well-perfused.   Skin: Warm, dry. No jaundice.       Medications: [Standing]  heparin   Injectable 5000 Unit(s) SubCutaneous every 8 hours  pantoprazole  Injectable 40 milliGRAM(s) IV Push daily  piperacillin/tazobactam IVPB. 3.375 Gram(s) IV Intermittent once  piperacillin/tazobactam IVPB.. 3.375 Gram(s) IV Intermittent every 8 hours    DVT Prophylaxis: heparin   Injectable 5000 Unit(s) SubCutaneous every 8 hours    GI Prophylaxis: pantoprazole  Injectable 40 milliGRAM(s) IV Push daily    Antibiotics: piperacillin/tazobactam IVPB. 3.375 Gram(s) IV Intermittent once  piperacillin/tazobactam IVPB.. 3.375 Gram(s) IV Intermittent every 8 hours    Anticoagulation:   Medications:[PRN]  acetaminophen   Tablet .. 650 milliGRAM(s) Oral every 6 hours PRN      Labs:                        11.9   9.23  )-----------( 289      ( 08 Dec 2020 22:41 )             36.5     12-08    136  |  102  |  10  ----------------------------<  82  3.7   |  23  |  0.6<L>    Ca    8.2<L>      08 Dec 2020 22:41  Phos  2.6     12-08  Mg     2.0     12-08    TPro  7.0  /  Alb  3.9  /  TBili  0.6  /  DBili  <0.2  /  AST  65<H>  /  ALT  56<H>  /  AlkPhos  43  12-07    LIVER FUNCTIONS - ( 07 Dec 2020 12:00 )  Alb: 3.9 g/dL / Pro: 7.0 g/dL / ALK PHOS: 43 U/L / ALT: 56 U/L / AST: 65 U/L / GGT: x               CARDIAC MARKERS ( 07 Dec 2020 12:00 )  x     / <0.01 ng/mL / x     / x     / x            Urine/Micro:    Culture - Urine (collected 07 Dec 2020 12:58)  Source: .Urine Clean Catch (Midstream)  Final Report (08 Dec 2020 18:34):    Normal Urogenital gustavo present      Urinalysis Basic - ( 07 Dec 2020 12:58 )    Color: Yellow / Appearance: Clear / S.030 / pH: x  Gluc: x / Ketone: Small  / Bili: Negative / Urobili: <2 mg/dL   Blood: x / Protein: 30 mg/dL / Nitrite: Negative   Leuk Esterase: Negative / RBC: 5 /HPF / WBC 3 /HPF   Sq Epi: x / Non Sq Epi: 2 /HPF / Bacteria: Negative        Imaging:   No new images      
Progress Note: General Surgery  Patient: [address] , 65y (1955)Female   MRN: [id number]  Location: [address]  Visit: 20 Inpatient  Date: 12-    Admit Diagnosis/Chief Complaint:  acute appendicitis    Procedure/Diagnosis:  medical management for acute appendicitis    Events/ 24h: No acute events overnight. Pain controlled.      T(C): 36.2 (12-10-20 @ 00:00), Max: 36.6 (20 @ 11:00)  HR: 82 (12-10-20 @ 00:00) (82 - 87)  BP: 111/60 (12-10-20 @ 00:00) (111/60 - 139/70)  RR: 19 (12-10-20 @ 00:00) (18 - 20)  SpO2: 94% (12-10-20 @ 00:00) (94% - 97%)      Diet: Diet, Regular:   DASH/TLC Sodium & Cholesterol Restricted (20 @ 22:33)    IV Fluids:     Physical Examination:  General Appearance: NAD   HEENT: EOMI, sclera non-icteric.  Heart: RRR   Lungs: CTABL.   Abdomen:  Soft, mildly tender, nondistended. no rebound or guarding  MSK/Extremities: Warm & well-perfused.   Skin: Warm, dry. No jaundice.       Medications: [Standing]  heparin   Injectable 5000 Unit(s) SubCutaneous every 8 hours  pantoprazole  Injectable 40 milliGRAM(s) IV Push daily  piperacillin/tazobactam IVPB. 3.375 Gram(s) IV Intermittent once  piperacillin/tazobactam IVPB.. 3.375 Gram(s) IV Intermittent every 8 hours    DVT Prophylaxis: heparin   Injectable 5000 Unit(s) SubCutaneous every 8 hours    GI Prophylaxis: pantoprazole  Injectable 40 milliGRAM(s) IV Push daily    Antibiotics: piperacillin/tazobactam IVPB. 3.375 Gram(s) IV Intermittent once  piperacillin/tazobactam IVPB.. 3.375 Gram(s) IV Intermittent every 8 hours    Anticoagulation:   Medications:[PRN]  acetaminophen   Tablet .. 650 milliGRAM(s) Oral every 6 hours PRN      Labs:                        11.9   9.23  )-----------( 289      ( 08 Dec 2020 22:41 )             36.5         136  |  102  |  10  ----------------------------<  82  3.7   |  23  |  0.6<L>    Ca    8.2<L>      08 Dec 2020 22:41  Phos  2.6     12  Mg     2.0         TPro  7.0  /  Alb  3.9  /  TBili  0.6  /  DBili  <0.2  /  AST  65<H>  /  ALT  56<H>  /  AlkPhos  43  12    LIVER FUNCTIONS - ( 07 Dec 2020 12:00 )  Alb: 3.9 g/dL / Pro: 7.0 g/dL / ALK PHOS: 43 U/L / ALT: 56 U/L / AST: 65 U/L / GGT: x               CARDIAC MARKERS ( 07 Dec 2020 12:00 )  x     / <0.01 ng/mL / x     / x     / x            Urine/Micro:    Culture - Urine (collected 07 Dec 2020 12:58)  Source: .Urine Clean Catch (Midstream)  Final Report (08 Dec 2020 18:34):    Normal Urogenital gustavo present      Urinalysis Basic - ( 07 Dec 2020 12:58 )    Color: Yellow / Appearance: Clear / S.030 / pH: x  Gluc: x / Ketone: Small  / Bili: Negative / Urobili: <2 mg/dL   Blood: x / Protein: 30 mg/dL / Nitrite: Negative   Leuk Esterase: Negative / RBC: 5 /HPF / WBC 3 /HPF   Sq Epi: x / Non Sq Epi: 2 /HPF / Bacteria: Negative        Imaging:   No new images